# Patient Record
Sex: FEMALE | Race: WHITE | Employment: STUDENT | URBAN - NONMETROPOLITAN AREA
[De-identification: names, ages, dates, MRNs, and addresses within clinical notes are randomized per-mention and may not be internally consistent; named-entity substitution may affect disease eponyms.]

---
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
## 2018-09-14 ENCOUNTER — DOCTOR'S OFFICE (OUTPATIENT)
Dept: URBAN - NONMETROPOLITAN AREA CLINIC 1 | Facility: CLINIC | Age: 18
Setting detail: OPHTHALMOLOGY
End: 2018-09-14
Payer: COMMERCIAL

## 2018-09-14 DIAGNOSIS — H16.001: ICD-10-CM

## 2018-09-14 PROCEDURE — 92012 INTRM OPH EXAM EST PATIENT: CPT | Performed by: OPTOMETRIST

## 2018-09-14 ASSESSMENT — REFRACTION_CURRENTRX
OD_CYLINDER: SPH
OS_CYLINDER: SPH
OS_VPRISM_DIRECTION: SV
OD_OVR_VA: 20/
OD_OVR_VA: 20/
OS_OVR_VA: 20/
OD_VPRISM_DIRECTION: SV
OS_OVR_VA: 20/
OS_SPHERE: -3.25
OD_OVR_VA: 20/
OS_OVR_VA: 20/
OD_SPHERE: -3.25

## 2018-09-14 ASSESSMENT — REFRACTION_MANIFEST
OS_VA1: 20/20-2
OS_CYLINDER: -0.50
OD_CYLINDER: -0.50
OD_VA2: 20/20
OD_VA1: 20/
OS_VA3: 20/
OS_VA2: 20/20
OU_VA: 20/20
OS_VA3: 20/
OD_SPHERE: -3.75
OD_VA2: 20/
OD_AXIS: 160
OS_SPHERE: -3.75
OU_VA: 20/
OD_VA3: 20/
OS_AXIS: 180
OS_VA1: 20/
OD_VA3: 20/
OS_VA2: 20/
OD_VA1: 20/20-2

## 2018-09-14 ASSESSMENT — SPHEQUIV_DERIVED
OD_SPHEQUIV: -4
OS_SPHEQUIV: -4.25
OD_SPHEQUIV: -4.75
OS_SPHEQUIV: -4

## 2018-09-14 ASSESSMENT — LID EXAM ASSESSMENTS: OD_EDEMA: RUL T 1+

## 2018-09-14 ASSESSMENT — VISUAL ACUITY
OD_BCVA: 20/20
OS_BCVA: 20/25-2

## 2018-09-14 ASSESSMENT — REFRACTION_AUTOREFRACTION
OS_CYLINDER: 0.00
OD_SPHERE: -4.75
OD_CYLINDER: 0.00
OD_AXIS: 180
OS_AXIS: 180
OS_SPHERE: -4.25

## 2018-09-17 ENCOUNTER — DOCTOR'S OFFICE (OUTPATIENT)
Dept: URBAN - NONMETROPOLITAN AREA CLINIC 1 | Facility: CLINIC | Age: 18
Setting detail: OPHTHALMOLOGY
End: 2018-09-17
Payer: COMMERCIAL

## 2018-09-17 DIAGNOSIS — H16.001: ICD-10-CM

## 2018-09-17 PROCEDURE — 92012 INTRM OPH EXAM EST PATIENT: CPT | Performed by: OPTOMETRIST

## 2018-09-17 ASSESSMENT — REFRACTION_CURRENTRX
OD_OVR_VA: 20/
OS_OVR_VA: 20/

## 2018-09-17 ASSESSMENT — VISUAL ACUITY
OD_BCVA: 20/20
OS_BCVA: 20/25-2

## 2018-09-17 ASSESSMENT — REFRACTION_MANIFEST
OS_VA2: 20/
OS_VA1: 20/
OS_VA3: 20/
OS_VA3: 20/
OS_VA2: 20/
OU_VA: 20/
OD_VA3: 20/
OD_VA3: 20/
OD_VA1: 20/
OU_VA: 20/
OD_VA1: 20/
OD_VA2: 20/
OS_VA1: 20/
OD_VA2: 20/

## 2018-09-17 ASSESSMENT — CONFRONTATIONAL VISUAL FIELD TEST (CVF)
OS_FINDINGS: FULL
OD_FINDINGS: FULL

## 2018-09-24 ENCOUNTER — RX ONLY (RX ONLY)
Age: 18
End: 2018-09-24

## 2018-09-24 ENCOUNTER — DOCTOR'S OFFICE (OUTPATIENT)
Dept: URBAN - NONMETROPOLITAN AREA CLINIC 1 | Facility: CLINIC | Age: 18
Setting detail: OPHTHALMOLOGY
End: 2018-09-24
Payer: COMMERCIAL

## 2018-09-24 DIAGNOSIS — H16.001: ICD-10-CM

## 2018-09-24 PROCEDURE — 92012 INTRM OPH EXAM EST PATIENT: CPT | Performed by: OPTOMETRIST

## 2018-09-24 ASSESSMENT — REFRACTION_CURRENTRX
OD_OVR_VA: 20/
OS_OVR_VA: 20/

## 2018-09-24 ASSESSMENT — REFRACTION_MANIFEST
OS_VA3: 20/
OD_VA1: 20/
OD_VA2: 20/
OS_VA1: 20/
OS_VA3: 20/
OS_VA1: 20/
OD_VA2: 20/
OS_VA2: 20/
OS_VA2: 20/
OU_VA: 20/
OD_VA1: 20/
OU_VA: 20/
OD_VA3: 20/
OD_VA3: 20/

## 2018-09-24 ASSESSMENT — CORNEAL SURGICAL SCARRING: OD_SCARRING: PERIPHERAL STROMAL

## 2018-09-24 ASSESSMENT — CONFRONTATIONAL VISUAL FIELD TEST (CVF)
OS_FINDINGS: FULL
OD_FINDINGS: FULL

## 2018-09-24 ASSESSMENT — VISUAL ACUITY
OS_BCVA: 20/25
OD_BCVA: 20/20

## 2018-10-22 ENCOUNTER — DOCTOR'S OFFICE (OUTPATIENT)
Dept: URBAN - NONMETROPOLITAN AREA CLINIC 1 | Facility: CLINIC | Age: 18
Setting detail: OPHTHALMOLOGY
End: 2018-10-22
Payer: COMMERCIAL

## 2018-10-22 DIAGNOSIS — H17.821: ICD-10-CM

## 2018-10-22 DIAGNOSIS — H16.001: ICD-10-CM

## 2018-10-22 PROCEDURE — 92012 INTRM OPH EXAM EST PATIENT: CPT | Performed by: OPTOMETRIST

## 2018-10-22 ASSESSMENT — VISUAL ACUITY
OS_BCVA: 20/20
OD_BCVA: 20/20

## 2018-10-22 ASSESSMENT — REFRACTION_MANIFEST
OS_VA3: 20/
OD_VA1: 20/
OD_VA2: 20/
OS_VA3: 20/
OS_VA1: 20/
OS_VA2: 20/
OD_VA3: 20/
OD_VA1: 20/
OU_VA: 20/
OS_VA2: 20/
OU_VA: 20/
OS_VA1: 20/
OD_VA2: 20/
OD_VA3: 20/

## 2018-10-22 ASSESSMENT — REFRACTION_CURRENTRX
OS_OVR_VA: 20/
OD_OVR_VA: 20/
OS_OVR_VA: 20/
OD_OVR_VA: 20/
OD_OVR_VA: 20/
OS_OVR_VA: 20/

## 2018-10-22 ASSESSMENT — CONFRONTATIONAL VISUAL FIELD TEST (CVF)
OD_FINDINGS: FULL
OS_FINDINGS: FULL

## 2018-10-22 ASSESSMENT — CORNEAL SURGICAL SCARRING: OD_SCARRING: PERIPHERAL STROMAL

## 2018-12-20 ENCOUNTER — DOCTOR'S OFFICE (OUTPATIENT)
Dept: URBAN - NONMETROPOLITAN AREA CLINIC 1 | Facility: CLINIC | Age: 18
Setting detail: OPHTHALMOLOGY
End: 2018-12-20
Payer: COMMERCIAL

## 2018-12-20 DIAGNOSIS — H52.13: ICD-10-CM

## 2018-12-20 DIAGNOSIS — H52.223: ICD-10-CM

## 2018-12-20 PROBLEM — H16.001 CORNEAL ULCER; RIGHT EYE: Status: RESOLVED | Noted: 2018-09-14 | Resolved: 2018-12-20

## 2018-12-20 PROCEDURE — 92014 COMPRE OPH EXAM EST PT 1/>: CPT | Performed by: OPTOMETRIST

## 2018-12-20 PROCEDURE — 92310 CONTACT LENS FITTING OU: CPT | Performed by: OPTOMETRIST

## 2018-12-20 ASSESSMENT — REFRACTION_MANIFEST
OS_VA3: 20/
OD_VA3: 20/
OD_CYLINDER: -0.50
OS_VA1: 20/20
OU_VA: 20/20
OD_VA3: 20/
OD_VA2: 20/
OD_AXIS: 150
OS_VA2: 20/
OD_VA1: 20/
OD_SPHERE: -4.00
OS_CYLINDER: -0.50
OS_SPHERE: -4.00
OD_VA2: 20/
OU_VA: 20/
OS_AXIS: 015
OS_VA2: 20/
OD_VA1: 20/20
OS_VA1: 20/
OS_VA3: 20/

## 2018-12-20 ASSESSMENT — VISUAL ACUITY
OD_BCVA: 20/30
OS_BCVA: 20/30

## 2018-12-20 ASSESSMENT — REFRACTION_CURRENTRX
OS_OVR_VA: 20/
OD_OVR_VA: 20/
OD_OVR_VA: 20/
OS_OVR_VA: 20/
OS_OVR_VA: 20/
OD_OVR_VA: 20/

## 2018-12-20 ASSESSMENT — CORNEAL SURGICAL SCARRING: OD_SCARRING: PERIPHERAL STROMAL

## 2018-12-20 ASSESSMENT — SPHEQUIV_DERIVED
OD_SPHEQUIV: -4.25
OS_SPHEQUIV: -4.25

## 2018-12-20 ASSESSMENT — CONFRONTATIONAL VISUAL FIELD TEST (CVF)
OS_FINDINGS: FULL
OD_FINDINGS: FULL

## 2019-12-31 ENCOUNTER — DOCTOR'S OFFICE (OUTPATIENT)
Dept: URBAN - NONMETROPOLITAN AREA CLINIC 1 | Facility: CLINIC | Age: 19
Setting detail: OPHTHALMOLOGY
End: 2019-12-31
Payer: COMMERCIAL

## 2019-12-31 DIAGNOSIS — H52.223: ICD-10-CM

## 2019-12-31 DIAGNOSIS — H52.13: ICD-10-CM

## 2019-12-31 PROCEDURE — 92310 CONTACT LENS FITTING OU: CPT | Performed by: OPTOMETRIST

## 2019-12-31 PROCEDURE — 92014 COMPRE OPH EXAM EST PT 1/>: CPT | Performed by: OPTOMETRIST

## 2019-12-31 ASSESSMENT — REFRACTION_MANIFEST
OD_CYLINDER: -0.25
OD_VA1: 20/20
OS_AXIS: 060
OS_VA1: 20/20
OS_SPHERE: -4.00
OD_SPHERE: -4.00
OS_CYLINDER: -0.25
OS_VA3: 20/
OU_VA: 20/20
OD_VA2: 20/20
OD_VA3: 20/
OS_VA2: 20/20
OD_AXIS: 130

## 2019-12-31 ASSESSMENT — REFRACTION_AUTOREFRACTION
OS_CYLINDER: -0.50
OD_AXIS: 132
OS_AXIS: 057
OS_SPHERE: -3.25
OD_SPHERE: -3.50
OD_CYLINDER: -1.00

## 2019-12-31 ASSESSMENT — CONFRONTATIONAL VISUAL FIELD TEST (CVF)
OS_FINDINGS: FULL
OD_FINDINGS: FULL

## 2019-12-31 ASSESSMENT — CORNEAL SURGICAL SCARRING: OD_SCARRING: PERIPHERAL STROMAL

## 2019-12-31 ASSESSMENT — SPHEQUIV_DERIVED
OD_SPHEQUIV: -4.125
OD_SPHEQUIV: -4
OS_SPHEQUIV: -4.125
OS_SPHEQUIV: -3.5

## 2019-12-31 ASSESSMENT — VISUAL ACUITY
OD_BCVA: 20/25
OS_BCVA: 20/25

## 2020-07-09 ENCOUNTER — OPTICAL OFFICE (OUTPATIENT)
Dept: URBAN - NONMETROPOLITAN AREA CLINIC 4 | Facility: CLINIC | Age: 20
Setting detail: OPHTHALMOLOGY
End: 2020-07-09
Payer: COMMERCIAL

## 2020-07-09 DIAGNOSIS — H52.223: ICD-10-CM

## 2020-07-09 PROCEDURE — V2750 ANTI-REFLECTIVE COATING: HCPCS | Performed by: OPTOMETRIST

## 2020-07-09 PROCEDURE — V2020 VISION SVCS FRAMES PURCHASES: HCPCS | Performed by: OPTOMETRIST

## 2020-07-09 PROCEDURE — V2025 EYEGLASSES DELUX FRAMES: HCPCS | Performed by: OPTOMETRIST

## 2020-07-09 PROCEDURE — V2103 SPHEROCYLINDR 4.00D/12-2.00D: HCPCS | Performed by: OPTOMETRIST

## 2020-12-11 ENCOUNTER — TRANSCRIBE ORDERS (OUTPATIENT)
Dept: URGENT CARE | Facility: CLINIC | Age: 20
End: 2020-12-11

## 2020-12-11 ENCOUNTER — APPOINTMENT (OUTPATIENT)
Dept: URGENT CARE | Facility: CLINIC | Age: 20
End: 2020-12-11

## 2020-12-11 DIAGNOSIS — Z02.1 PHYSICAL EXAM, PRE-EMPLOYMENT: Primary | ICD-10-CM

## 2020-12-11 DIAGNOSIS — Z02.1 PHYSICAL EXAM, PRE-EMPLOYMENT: ICD-10-CM

## 2020-12-11 PROCEDURE — 86765 RUBEOLA ANTIBODY: CPT | Performed by: PHYSICIAN ASSISTANT

## 2020-12-11 PROCEDURE — 86762 RUBELLA ANTIBODY: CPT | Performed by: PHYSICIAN ASSISTANT

## 2020-12-11 PROCEDURE — 86787 VARICELLA-ZOSTER ANTIBODY: CPT | Performed by: PHYSICIAN ASSISTANT

## 2020-12-11 PROCEDURE — 86480 TB TEST CELL IMMUN MEASURE: CPT | Performed by: PHYSICIAN ASSISTANT

## 2020-12-11 PROCEDURE — 86735 MUMPS ANTIBODY: CPT | Performed by: PHYSICIAN ASSISTANT

## 2020-12-12 LAB — RUBV IGG SERPL IA-ACNC: >175 IU/ML

## 2020-12-14 LAB — VZV IGG SER IA-ACNC: NORMAL

## 2020-12-15 LAB
GAMMA INTERFERON BACKGROUND BLD IA-ACNC: 0.02 IU/ML
M TB IFN-G BLD-IMP: NEGATIVE
M TB IFN-G CD4+ BCKGRND COR BLD-ACNC: 0 IU/ML
M TB IFN-G CD4+ BCKGRND COR BLD-ACNC: 0 IU/ML
MEV IGG SER QL: NORMAL
MITOGEN IGNF BCKGRD COR BLD-ACNC: >10 IU/ML
MUV IGG SER QL: NORMAL

## 2021-03-10 ENCOUNTER — RX ONLY (RX ONLY)
Age: 21
End: 2021-03-10

## 2021-03-10 ENCOUNTER — DOCTOR'S OFFICE (OUTPATIENT)
Dept: URBAN - NONMETROPOLITAN AREA CLINIC 1 | Facility: CLINIC | Age: 21
Setting detail: OPHTHALMOLOGY
End: 2021-03-10
Payer: COMMERCIAL

## 2021-03-10 VITALS — HEIGHT: 55 IN

## 2021-03-10 DIAGNOSIS — H52.13: ICD-10-CM

## 2021-03-10 DIAGNOSIS — H52.223: ICD-10-CM

## 2021-03-10 PROBLEM — H17.821 CORNEAL SCAR PERIPHERAL; RIGHT EYE: Status: ACTIVE | Noted: 2018-10-22

## 2021-03-10 PROCEDURE — 92014 COMPRE OPH EXAM EST PT 1/>: CPT | Performed by: OPTOMETRIST

## 2021-03-10 PROCEDURE — 92310 CONTACT LENS FITTING OU: CPT | Performed by: OPTOMETRIST

## 2021-03-10 ASSESSMENT — REFRACTION_MANIFEST
OD_VA1: 20/20
OS_CYLINDER: -0.25
OS_VA1: 20/20
OU_VA: 20/20
OD_SPHERE: -4.00
OD_CYLINDER: -0.25
OS_SPHERE: -4.00
OS_AXIS: 060
OD_AXIS: 130
OS_VA2: 20/20
OD_VA2: 20/20

## 2021-03-10 ASSESSMENT — REFRACTION_AUTOREFRACTION
OS_CYLINDER: -0.25
OD_AXIS: 128
OS_AXIS: 48
OS_SPHERE: -4.00
OD_CYLINDER: -0.25
OD_SPHERE: -4.00

## 2021-03-10 ASSESSMENT — VISUAL ACUITY
OD_BCVA: 20/20
OS_BCVA: 20/20

## 2021-03-10 ASSESSMENT — SPHEQUIV_DERIVED
OS_SPHEQUIV: -4.125
OS_SPHEQUIV: -4.125
OD_SPHEQUIV: -4.125
OD_SPHEQUIV: -4.125

## 2021-03-10 ASSESSMENT — TONOMETRY
OD_IOP_MMHG: 15
OS_IOP_MMHG: 15

## 2021-03-10 ASSESSMENT — CORNEAL SURGICAL SCARRING: OD_SCARRING: PERIPHERAL STROMAL

## 2021-03-10 ASSESSMENT — CONFRONTATIONAL VISUAL FIELD TEST (CVF)
OD_FINDINGS: FULL
OS_FINDINGS: FULL

## 2021-09-16 ENCOUNTER — APPOINTMENT (OUTPATIENT)
Dept: LAB | Facility: CLINIC | Age: 21
End: 2021-09-16

## 2021-09-16 DIAGNOSIS — Z02.1 PRE-EMPLOYMENT EXAMINATION: ICD-10-CM

## 2021-09-16 PROCEDURE — 86480 TB TEST CELL IMMUN MEASURE: CPT

## 2021-09-16 PROCEDURE — 36415 COLL VENOUS BLD VENIPUNCTURE: CPT

## 2021-09-19 LAB
GAMMA INTERFERON BACKGROUND BLD IA-ACNC: 0.02 IU/ML
M TB IFN-G BLD-IMP: NEGATIVE
M TB IFN-G CD4+ BCKGRND COR BLD-ACNC: 0 IU/ML
M TB IFN-G CD4+ BCKGRND COR BLD-ACNC: 0 IU/ML
MITOGEN IGNF BCKGRD COR BLD-ACNC: >10 IU/ML

## 2022-07-13 ENCOUNTER — HOSPITAL ENCOUNTER (EMERGENCY)
Facility: HOSPITAL | Age: 22
Discharge: HOME | End: 2022-07-13
Attending: EMERGENCY MEDICINE
Payer: OTHER MISCELLANEOUS

## 2022-07-13 VITALS
WEIGHT: 165 LBS | TEMPERATURE: 98.2 F | HEART RATE: 72 BPM | SYSTOLIC BLOOD PRESSURE: 144 MMHG | DIASTOLIC BLOOD PRESSURE: 82 MMHG | HEIGHT: 66 IN | RESPIRATION RATE: 16 BRPM | BODY MASS INDEX: 26.52 KG/M2 | OXYGEN SATURATION: 100 %
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
## 2022-07-13 LAB
ALT SERPL W P-5'-P-CCNC: 15 U/L (ref 12–78)
HBV SURFACE AB SER-ACNC: <3.1 MIU/ML
HBV SURFACE AG SER QL: NORMAL
HCV AB SER QL: NORMAL

## 2022-07-13 PROCEDURE — 86803 HEPATITIS C AB TEST: CPT | Performed by: EMERGENCY MEDICINE

## 2022-07-13 PROCEDURE — 86706 HEP B SURFACE ANTIBODY: CPT | Performed by: EMERGENCY MEDICINE

## 2022-07-13 PROCEDURE — 99282 EMERGENCY DEPT VISIT SF MDM: CPT | Performed by: EMERGENCY MEDICINE

## 2022-07-13 PROCEDURE — 84460 ALANINE AMINO (ALT) (SGPT): CPT | Performed by: EMERGENCY MEDICINE

## 2022-07-13 PROCEDURE — 87340 HEPATITIS B SURFACE AG IA: CPT | Performed by: EMERGENCY MEDICINE

## 2022-07-13 PROCEDURE — 36415 COLL VENOUS BLD VENIPUNCTURE: CPT | Performed by: EMERGENCY MEDICINE

## 2022-07-13 PROCEDURE — 99283 EMERGENCY DEPT VISIT LOW MDM: CPT

## 2022-07-13 PROCEDURE — 87389 HIV-1 AG W/HIV-1&-2 AB AG IA: CPT | Performed by: EMERGENCY MEDICINE

## 2022-07-13 NOTE — ED PROVIDER NOTES
History  Chief Complaint   Patient presents with    Infectious Exposure - Needlestick     Needle stick while putting in IV     42-year-old female without pertinent history who is up-to-date on immunizations including hepatitis who presents for needlestick injury that occurred while at work today  States that she accidentally picked up an IV needle that she had already placed in a patient off the floor and when she did  off the floor she accidentally poked her left thumb  Denies any concerns for any infections from the source patient  States that she does not want any prophylactic treatments and will wait on lab results  Denies any significant injury from the needle stick  States that she did wash out the injury with alcohol and then with soap and water  No other concerns  None       History reviewed  No pertinent past medical history  History reviewed  No pertinent surgical history  History reviewed  No pertinent family history  I have reviewed and agree with the history as documented  E-Cigarette/Vaping    E-Cigarette Use Never User      E-Cigarette/Vaping Substances     Social History     Tobacco Use    Smoking status: Never Smoker    Smokeless tobacco: Never Used   Vaping Use    Vaping Use: Never used   Substance Use Topics    Drug use: Never       Review of Systems   Constitutional: Negative for activity change, appetite change, chills and fever  HENT: Negative for congestion, rhinorrhea and sore throat  Eyes: Negative for visual disturbance  Respiratory: Negative for chest tightness, shortness of breath and wheezing  Cardiovascular: Negative for chest pain, palpitations and leg swelling  Gastrointestinal: Negative for abdominal pain, constipation, diarrhea, nausea and vomiting  Genitourinary: Negative for dysuria, flank pain and pelvic pain  Musculoskeletal: Negative for back pain and neck pain  Skin: Positive for wound  Negative for rash     Neurological: Negative for weakness, numbness and headaches  Psychiatric/Behavioral: Negative for behavioral problems  Physical Exam  Physical Exam  Vitals and nursing note reviewed  Constitutional:       General: She is not in acute distress  Appearance: She is well-developed  She is not diaphoretic  HENT:      Head: Normocephalic and atraumatic  Right Ear: External ear normal       Left Ear: External ear normal       Nose: Nose normal    Eyes:      Pupils: Pupils are equal, round, and reactive to light  Cardiovascular:      Rate and Rhythm: Normal rate  Pulses: Normal pulses  Pulmonary:      Effort: Pulmonary effort is normal    Musculoskeletal:         General: No tenderness or deformity  Normal range of motion  Cervical back: Normal range of motion and neck supple  Skin:     General: Skin is warm and dry  Capillary Refill: Capillary refill takes less than 2 seconds  Comments: Left thumb with reported pinpoint injuries; not visible on site   Neurological:      Mental Status: She is alert and oriented to person, place, and time  Motor: No abnormal muscle tone           Vital Signs  ED Triage Vitals [07/13/22 0610]   Temperature Pulse Respirations Blood Pressure SpO2   98 2 °F (36 8 °C) 72 16 144/82 100 %      Temp Source Heart Rate Source Patient Position - Orthostatic VS BP Location FiO2 (%)   Temporal Monitor Sitting Left arm --      Pain Score       No Pain           Vitals:    07/13/22 0610   BP: 144/82   Pulse: 72   Patient Position - Orthostatic VS: Sitting         Visual Acuity      ED Medications  Medications - No data to display    Diagnostic Studies  Results Reviewed     Procedure Component Value Units Date/Time    ALT [[id number]]  (Normal) Collected: 07/13/22 0620    Lab Status: Final result Specimen: Blood from Arm, Right Updated: 07/13/22 0642     ALT 15 U/L     HIV 1/2 Antigen/Antibody (4th Generation) w Reflex [facility] [[id number]] Collected: 07/13/22 0620    Lab Status: In process Specimen: Blood from Arm, Right Updated: 07/13/22 0624    Hepatitis B surface antigen [[id number]] Collected: 07/13/22 0620    Lab Status: In process Specimen: Blood from Arm, Right Updated: 07/13/22 0624    Hepatitis C antibody [[id number]] Collected: 07/13/22 7496    Lab Status: In process Specimen: Blood from Arm, Right Updated: 07/13/22 0624    Hepatitis B surface antibody [[id number]] Collected: 07/13/22 0813    Lab Status: In process Specimen: Blood from Arm, Right Updated: 07/13/22 0624                 No orders to display              Procedures  Procedures         ED Course               SBIRT 20yo+    Flowsheet Row Most Recent Value   SBIRT (25 yo +)    In order to provide better care to our patients, we are screening all of our patients for alcohol and drug use  Would it be okay to ask you these screening questions? Yes Filed at: 07/13/2022 0609   Initial Alcohol Screen: US AUDIT-C     1  How often do you have a drink containing alcohol? 0 Filed at: 07/13/2022 0609   2  How many drinks containing alcohol do you have on a typical day you are drinking? 0 Filed at: 07/13/2022 0609   3a  Male UNDER 65: How often do you have five or more drinks on one occasion? 0 Filed at: 07/13/2022 0609   3b  FEMALE Any Age, or MALE 65+: How often do you have 4 or more drinks on one occassion? 0 Filed at: 07/13/2022 2129   Audit-C Score 0 Filed at: 07/13/2022 2146   NORRIS: How many times in the past year have you    Used an illegal drug or used a prescription medication for non-medical reasons? Never Filed at: 07/13/2022 3104               RESULTS:  Results Reviewed     Procedure Component Value Units Date/Time    ALT [[id number]]  (Normal) Collected: 07/13/22 0620    Lab Status: Final result Specimen: Blood from Arm, Right Updated: 07/13/22 0642     ALT 15 U/L     HIV 1/2 Antigen/Antibody (4th Generation) w Reflex [facility] [[id number]] Collected: 07/13/22 0620    Lab Status:  In process Specimen: Blood from Arm, Right Updated: 07/13/22 0624    Hepatitis B surface antigen [[id number]] Collected: 07/13/22 0620    Lab Status: In process Specimen: Blood from Arm, Right Updated: 07/13/22 0624    Hepatitis C antibody [[id number]] Collected: 07/13/22 3029    Lab Status: In process Specimen: Blood from Arm, Right Updated: 07/13/22 0624    Hepatitis B surface antibody [[id number]] Collected: 07/13/22 3967    Lab Status: In process Specimen: Blood from Arm, Right Updated: 07/13/22 0624          No orders to display       Vitals:    07/13/22 0610   BP: 144/82   TempSrc: Temporal   Pulse: 72   Resp: 16   Patient Position - Orthostatic VS: Sitting   Temp: 98 2 °F (36 8 °C)             MDM  Number of Diagnoses or Management Options  Diagnosis management comments: 47-year-old female without pertinent history who presents for needlestick injury  Patient does not have any concerns from the source patient for an infection  Does not wash to have prophylactic treatment  Lab order placed for testing to confirm no infection from needlestick injury  Patient's thumb does not have any signs of bleeding  Already cleaned out and irrigated prior to arrival   Advised returning for worsening symptoms  Understands outpatient management plan  Form filled out  Amount and/or Complexity of Data Reviewed  Review and summarize past medical records: yes        Disposition  Final diagnoses:   Needlestick injury accident with exposure to body fluid     Time reflects when diagnosis was documented in both MDM as applicable and the Disposition within this note     Time User Action Codes Description Comment    7/13/2022  6:35 AM Марина Lima Add [U70  1XXA] Needlestick injury accident with exposure to body fluid       ED Disposition     ED Disposition   Discharge    Condition   Stable    Date/Time   Wed Jul 13, 2022  6:35 AM    Comment   Kimberly Malcolm discharge to home/self care                 Follow-up Information     Follow up With Specialties Details Why Contact Info    Your PCP              There are no discharge medications for this patient  No discharge procedures on file      PDMP Review     None          ED Provider  Electronically Signed by           Fredy Kennedy MD  07/13/22 5697

## 2022-07-13 NOTE — DISCHARGE INSTRUCTIONS
Thank you for letting us take care of you  You have been evaluated for a needlestick injury  Please follow-up as instructed  Please return for worsening symptoms  At this time, you have no clinical evidence of symptoms or problems that will require hospitalization, however you should be evaluated soon by a primary care physician, and contact information has been provided  Follow up with your primary care physician  This is important as many medical conditions can be managed as an outpatient, in addition to routine health screening  Seeing your primary doctor often can help identify changes in the medical issue that brought you to the ED for care today  If you experience any new symptoms or acute worsening of current symptoms, please return to the ED

## 2022-07-14 LAB — HIV 1+2 AB+HIV1 P24 AG SERPL QL IA: NORMAL

## 2022-11-15 DIAGNOSIS — Z01.84 IMMUNITY TO HEPATITIS B VIRUS DEMONSTRATED BY SEROLOGIC TEST: Primary | ICD-10-CM

## 2023-01-13 ENCOUNTER — APPOINTMENT (OUTPATIENT)
Dept: LAB | Facility: HOSPITAL | Age: 23
End: 2023-01-13

## 2023-01-13 DIAGNOSIS — Z01.84 IMMUNITY TO HEPATITIS B VIRUS DEMONSTRATED BY SEROLOGIC TEST: ICD-10-CM

## 2023-01-14 LAB — HBV SURFACE AB SER-ACNC: 580.61 MIU/ML

## 2023-12-05 ENCOUNTER — DOCTOR'S OFFICE (OUTPATIENT)
Dept: URBAN - NONMETROPOLITAN AREA CLINIC 1 | Facility: CLINIC | Age: 23
Setting detail: OPHTHALMOLOGY
End: 2023-12-05
Payer: COMMERCIAL

## 2023-12-05 DIAGNOSIS — H52.223: ICD-10-CM

## 2023-12-05 DIAGNOSIS — H52.13: ICD-10-CM

## 2023-12-05 PROCEDURE — 92014 COMPRE OPH EXAM EST PT 1/>: CPT | Performed by: OPTOMETRIST

## 2023-12-05 PROCEDURE — 92310 CONTACT LENS FITTING OU: CPT | Performed by: OPTOMETRIST

## 2023-12-05 ASSESSMENT — CORNEAL SURGICAL SCARRING: OD_SCARRING: PERIPHERAL STROMAL

## 2023-12-05 ASSESSMENT — REFRACTION_AUTOREFRACTION
OS_AXIS: 021
OD_SPHERE: -3.75
OS_SPHERE: -3.75
OD_CYLINDER: -0.50
OS_CYLINDER: -0.75
OD_AXIS: 120

## 2023-12-05 ASSESSMENT — SPHEQUIV_DERIVED
OD_SPHEQUIV: -4
OD_SPHEQUIV: -4.25
OS_SPHEQUIV: -4.125
OS_SPHEQUIV: -4.125

## 2023-12-05 ASSESSMENT — REFRACTION_MANIFEST
OS_SPHERE: -4.00
OS_VA1: 20/20
OD_CYLINDER: -0.50
OS_AXIS: 060
OD_VA2: 20/20
OS_CYLINDER: -0.25
OD_AXIS: 120
OD_SPHERE: -4.00
OD_VA1: 20/20
OS_VA2: 20/20
OU_VA: 20/20

## 2023-12-05 ASSESSMENT — CONFRONTATIONAL VISUAL FIELD TEST (CVF)
OS_FINDINGS: FULL
OD_FINDINGS: FULL

## 2024-01-31 ENCOUNTER — HOSPITAL ENCOUNTER (EMERGENCY)
Facility: HOSPITAL | Age: 24
Discharge: HOME | End: 2024-01-31
Attending: EMERGENCY MEDICINE
Payer: OTHER MISCELLANEOUS

## 2024-01-31 VITALS
SYSTOLIC BLOOD PRESSURE: 130 MMHG | RESPIRATION RATE: 20 BRPM | TEMPERATURE: 97.3 F | OXYGEN SATURATION: 99 % | BODY MASS INDEX: 25.82 KG/M2 | DIASTOLIC BLOOD PRESSURE: 74 MMHG | WEIGHT: 160 LBS | HEART RATE: 68 BPM
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
## 2024-01-31 LAB
ALT SERPL W P-5'-P-CCNC: 25 U/L (ref 7–52)
HBV SURFACE AB SER-ACNC: 178 MIU/ML
HBV SURFACE AG SER QL: NORMAL
HCV AB SER QL: NORMAL
HIV 1+2 AB+HIV1 P24 AG SERPL QL IA: NORMAL
HIV 2 AB SERPL QL IA: NORMAL
HIV1 AB SERPL QL IA: NORMAL
HIV1 P24 AG SERPL QL IA: NORMAL

## 2024-01-31 PROCEDURE — 86803 HEPATITIS C AB TEST: CPT | Performed by: EMERGENCY MEDICINE

## 2024-01-31 PROCEDURE — 86706 HEP B SURFACE ANTIBODY: CPT | Performed by: EMERGENCY MEDICINE

## 2024-01-31 PROCEDURE — 84460 ALANINE AMINO (ALT) (SGPT): CPT | Performed by: EMERGENCY MEDICINE

## 2024-01-31 PROCEDURE — 87340 HEPATITIS B SURFACE AG IA: CPT | Performed by: EMERGENCY MEDICINE

## 2024-01-31 PROCEDURE — 36415 COLL VENOUS BLD VENIPUNCTURE: CPT | Performed by: EMERGENCY MEDICINE

## 2024-01-31 PROCEDURE — 99282 EMERGENCY DEPT VISIT SF MDM: CPT

## 2024-01-31 PROCEDURE — 99284 EMERGENCY DEPT VISIT MOD MDM: CPT | Performed by: EMERGENCY MEDICINE

## 2024-01-31 PROCEDURE — 87389 HIV-1 AG W/HIV-1&-2 AB AG IA: CPT | Performed by: EMERGENCY MEDICINE

## 2024-01-31 NOTE — ED NOTES
Patient had an accidental needle stick to her left index finger. Reports the patient threw their arm up and the needle dislodged sticking her finger. She reports the stick did bleed and she washed her hands with soap and water post exposure.      Obed Falk RN  01/31/24 0817

## 2024-01-31 NOTE — ED PROVIDER NOTES
"Emergency Department Employee Health Note  Margot Torres 23 y.o. female MRN: [id number]  Unit/Bed#: ED 07/ED 07 Encounter: [id number]        History of Present Illness     Chief Complaint:   Chief Complaint   Patient presents with    Body Fluid Exposure     Pt had needlestick injury with contaminated needle to left hand      HPI:  Margot Torres is a 23 y.o. female who presents with needlestick to left middle index finger..  Mechanism:           Patient is a 23-year-old reporting to the emergency room with report of a needlestick to her left index finger.  Patient is up-to-date on her tetanus.  Hepatitis B documented as August 2022.  Patient was attempting to inject a patient with subcutaneous heparin.  She had advised patient of same.  Patient subsequently \"jumped\" and caused the patient to sustain a needlestick in her left middle finger.  No other injury.        Review of Systems    Historical Information     Immunizations:   Immunization History   Administered Date(s) Administered    COVID-19 PFIZER VACCINE 0.3 ML IM 03/30/2021, 04/21/2021    DTaP 01/13/1999, 03/16/1999, 05/11/1999, 02/03/2003    HPV Quadrivalent 08/06/2012, 10/08/2012, 03/11/2013    Hep A, ped/adol, 2 dose 02/20/2007, 08/28/2007    Hep B / HiB 01/13/1999, 03/16/1999, 05/11/1999    Hep B, Adolescent or Pediatric 1998, 01/13/1999, 05/11/1999    Hepatitis B 08/30/2022, 11/15/2022    Hib (PRP-T) 01/13/1999, 03/16/1999, 05/11/1999, 05/08/2000    INFLUENZA 10/31/2018, 10/16/2019    IPV 01/13/1999, 03/16/1999, 05/08/2000, 02/13/2003    Influenza, seasonal, injectable 11/13/2007, 10/29/2008    MMR 02/06/2000, 02/03/2003    Meningococcal B, Recombinant (TRUMENBA) 08/15/2016, 02/20/2017    Meningococcal Conjugate (MCV4O) 08/15/2016    Meningococcal MCV4P 08/15/2016    Pneumococcal Conjugate PCV 7 01/15/2001, 12/22/2004    Rotavirus Pentavalent 01/13/1999, 03/16/1999, 05/11/1999    Tdap 07/28/2010, 07/29/2020    Tuberculin Skin Test-PPD " Intradermal 07/03/2019, 07/12/2019    Varicella 09/05/1999, 02/20/2007       History reviewed. No pertinent past medical history.  History reviewed. No pertinent family history.  History reviewed. No pertinent surgical history.  Social History     Tobacco Use    Smoking status: Never    Smokeless tobacco: Never   Vaping Use    Vaping status: Never Used   Substance Use Topics    Drug use: Never     E-Cigarette/Vaping    E-Cigarette Use Never User      E-Cigarette/Vaping Substances         Meds/Allergies   None       No Known Allergies    PHYSICAL EXAM  Physical Exam  Vitals and nursing note reviewed.   Constitutional:       General: She is not in acute distress.     Appearance: She is normal weight. She is not ill-appearing or toxic-appearing.   HENT:      Head: Normocephalic and atraumatic.      Right Ear: External ear normal.      Left Ear: External ear normal.      Nose: Nose normal.      Mouth/Throat:      Mouth: Mucous membranes are moist.   Pulmonary:      Effort: Pulmonary effort is normal. No respiratory distress.   Abdominal:      General: Abdomen is flat. There is no distension.   Musculoskeletal:         General: Signs of injury present.      Comments: Puncture wound left index finger overlying the middle phalanx.   Skin:     Coloration: Skin is not pale.   Neurological:      General: No focal deficit present.      Mental Status: She is alert.   Psychiatric:         Mood and Affect: Mood normal.         Thought Content: Thought content normal.         Judgment: Judgment normal.         Vital Signs  ED Triage Vitals [01/31/24 0749]   Temperature Pulse Respirations Blood Pressure SpO2   (!) 97.3 °F (36.3 °C) 68 20 130/74 99 %      Temp Source Heart Rate Source Patient Position - Orthostatic VS BP Location FiO2 (%)   Temporal Monitor Sitting Left arm --      Pain Score       --           Vitals:    01/31/24 0749 01/31/24 0924   BP: 130/74 130/74   Pulse: 68    Patient Position - Orthostatic VS: Sitting  "Sitting         Certification of Exposure:    (link to Employee Health Services: [facility] ([email or web address]): find link to BBP Exposure Instructions under important links on center of the page)    The patient is stable and has a history and physical exam consistent with an Sharp Injury and based on my assessment this exposure is Significant     If “NonSignificant” nothing further needs to be filled out.  If \"Significant\" please continue with the following questions    For Significant Exposures All of the following items must be completed:     Source Patient Name: Jasen Marie    Source Patient MRN: [id number]  Was the Source Patient's Provider contacted Yes, Providers name: Shinto   Was \"Exposure Panel - Source\" ordered (including Rapid HIV, HBsAg and anti-HCV) for Source Patient: Yes    Exposure Information    Type of Body Fluid Exposure: blood    Estimated volume of fluid: small up to 5cc     Exposure area:  intact skin    Skin Integrity: punctured    ED provider should review source patient chart for known history of HIV, Hepatitis B and Hepatitis C infection    Source patient HIV positive: No  Was the On-call Infectious Disease Provider contacted: No  Discussed treatment options with/for employee: No    Immunization History   Administered Date(s) Administered    COVID-19 PFIZER VACCINE 0.3 ML IM 03/30/2021, 04/21/2021    DTaP 01/13/1999, 03/16/1999, 05/11/1999, 02/03/2003    HPV Quadrivalent 08/06/2012, 10/08/2012, 03/11/2013    Hep A, ped/adol, 2 dose 02/20/2007, 08/28/2007    Hep B / HiB 01/13/1999, 03/16/1999, 05/11/1999    Hep B, Adolescent or Pediatric 1998, 01/13/1999, 05/11/1999    Hepatitis B 08/30/2022, 11/15/2022    Hib (PRP-T) 01/13/1999, 03/16/1999, 05/11/1999, 05/08/2000    INFLUENZA 10/31/2018, 10/16/2019    IPV 01/13/1999, 03/16/1999, 05/08/2000, 02/13/2003    Influenza, seasonal, injectable 11/13/2007, 10/29/2008    MMR 02/06/2000, 02/03/2003    Meningococcal B, " "Recombinant (TRUMENBA) 08/15/2016, 02/20/2017    Meningococcal Conjugate (MCV4O) 08/15/2016    Meningococcal MCV4P 08/15/2016    Pneumococcal Conjugate PCV 7 01/15/2001, 12/22/2004    Rotavirus Pentavalent 01/13/1999, 03/16/1999, 05/11/1999    Tdap 07/28/2010, 07/29/2020    Tuberculin Skin Test-PPD Intradermal 07/03/2019, 07/12/2019    Varicella 09/05/1999, 02/20/2007       Hepatitis B Vaccine History:    Immunization History   Administered Date(s) Administered    COVID-19 PFIZER VACCINE 0.3 ML IM 03/30/2021, 04/21/2021    DTaP 01/13/1999, 03/16/1999, 05/11/1999, 02/03/2003    HPV Quadrivalent 08/06/2012, 10/08/2012, 03/11/2013    Hep A, ped/adol, 2 dose 02/20/2007, 08/28/2007    Hep B / HiB 01/13/1999, 03/16/1999, 05/11/1999    Hep B, Adolescent or Pediatric 1998, 01/13/1999, 05/11/1999    Hepatitis B 08/30/2022, 11/15/2022    Hib (PRP-T) 01/13/1999, 03/16/1999, 05/11/1999, 05/08/2000    INFLUENZA 10/31/2018, 10/16/2019    IPV 01/13/1999, 03/16/1999, 05/08/2000, 02/13/2003    Influenza, seasonal, injectable 11/13/2007, 10/29/2008    MMR 02/06/2000, 02/03/2003    Meningococcal B, Recombinant (TRUMENBA) 08/15/2016, 02/20/2017    Meningococcal Conjugate (MCV4O) 08/15/2016    Meningococcal MCV4P 08/15/2016    Pneumococcal Conjugate PCV 7 01/15/2001, 12/22/2004    Rotavirus Pentavalent 01/13/1999, 03/16/1999, 05/11/1999    Tdap 07/28/2010, 07/29/2020    Tuberculin Skin Test-PPD Intradermal 07/03/2019, 07/12/2019    Varicella 09/05/1999, 02/20/2007       The patient has Previously been vaccinated for Hepatitis B.     HEPATITIS B IMMUNE GLOBULIN:  Not Indicated    Tetanus Vaccine History:    TDAP vaccination date: 2020    The patient has Tdap reported as up to date    Employee/Patient post exposure testing Has been performed.     \"Exposure Panel - Employee Baseline\"  (includes HBsAg, HBsAb, anti-HCV, ALT, HIV) with verbal consent and pretesting counseling for the patient Has been ordered.    Post-exposure " Prophylaxis treatment options were discussed today: Not Indicated      Visual Acuity      ED Medications  Medications - No data to display    Diagnostic Studies  Results Reviewed       Procedure Component Value Units Date/Time    Hepatitis C antibody [[id number]]  (Normal) Collected: 01/31/24 0902    Lab Status: Final result Specimen: Blood from Arm, Left Updated: 01/31/24 1413     Hepatitis C Ab Non-reactive    Hepatitis B surface antigen [[id number]]  (Normal) Collected: 01/31/24 0902    Lab Status: Final result Specimen: Blood from Arm, Left Updated: 01/31/24 1413     Hepatitis B Surface Ag Non-reactive    Hepatitis B surface antibody [[id number]] Collected: 01/31/24 0902    Lab Status: Final result Specimen: Blood from Arm, Left Updated: 01/31/24 1413     Hep B S Ab 178.00 mIU/mL     HIV 1/2 AG/AB w Reflex [facility] for 2 yr old and above [[id number]]  (Normal) Collected: 01/31/24 0902    Lab Status: Final result Specimen: Blood from Arm, Left Updated: 01/31/24 1409     HIV-1 p24 Antigen Non-Reactive     HIV-1 Antibody Non-Reactive     HIV-2 Antibody Non-Reactive     HIV Ag-Ab 5th Gen Non-Reactive    Narrative:      This 5th generation HIV Ag-Ab assay is a multiplex flow immunoassay intended for qualitative detection and differentiation of HIV-1 p24 antigen, HIV-1 (groups M and O) antibodies, and HIV-2 antibodies in human serum.  This assay is intended as an aid in the diagnosis of infection with HIV-1 and/or HIV-2, including acute (primary) HIV-1 infection. The test is validated for adult patients, including pregnant women, and in pediatric patients as young as two years of age. The performance of this assay has not been established for neonates.      ALT [[id number]]  (Normal) Collected: 01/31/24 0902    Lab Status: Final result Specimen: Blood from Arm, Left Updated: 01/31/24 0921     ALT 25 U/L                No orders to display              Procedures  Procedures         Medical Decision Making  Problems  Addressed:  Needlestick injury accident with exposure to body fluid: acute illness or injury    Amount and/or Complexity of Data Reviewed  Labs: ordered.        Disposition  Final diagnoses:   Needlestick injury accident with exposure to body fluid     Time reflects when diagnosis was documented in both MDM as applicable and the Disposition within this note       Time User Action Codes Description Comment    1/31/2024  9:11 AM Carlos Alvarez Add [W46.1XXA] Needlestick injury accident with exposure to body fluid           ED Disposition       ED Disposition   Discharge    Condition   Stable    Date/Time   Wed Jan 31, 2024  9:11 AM    Comment   Margot Torres discharge to home/self care.                   Follow-up Information    None         There are no discharge medications for this patient.      No discharge procedures on file.    PDMP Review       None              ED Provider  Electronically Signed by               Carlos Alvarez DO  01/31/24 144

## 2024-12-13 ENCOUNTER — DOCTOR'S OFFICE (OUTPATIENT)
Dept: URBAN - NONMETROPOLITAN AREA CLINIC 1 | Facility: CLINIC | Age: 24
Setting detail: OPHTHALMOLOGY
End: 2024-12-13
Payer: COMMERCIAL

## 2024-12-13 ENCOUNTER — APPOINTMENT (OUTPATIENT)
Dept: URGENT CARE | Facility: CLINIC | Age: 24
End: 2024-12-13

## 2024-12-13 ENCOUNTER — APPOINTMENT (OUTPATIENT)
Dept: LAB | Facility: CLINIC | Age: 24
End: 2024-12-13

## 2024-12-13 VITALS — HEIGHT: 55 IN

## 2024-12-13 DIAGNOSIS — Z02.1 PHYSICAL EXAM, PRE-EMPLOYMENT: ICD-10-CM

## 2024-12-13 DIAGNOSIS — H52.223: ICD-10-CM

## 2024-12-13 DIAGNOSIS — H52.13: ICD-10-CM

## 2024-12-13 DIAGNOSIS — Z02.1 PHYSICAL EXAM, PRE-EMPLOYMENT: Primary | ICD-10-CM

## 2024-12-13 LAB
MEV IGG SER QL IA: NORMAL
MUV IGG SER QL IA: NORMAL
RUBV IGG SERPL IA-ACNC: 61 IU/ML
VZV IGG SER QL IA: ABNORMAL

## 2024-12-13 PROCEDURE — 36415 COLL VENOUS BLD VENIPUNCTURE: CPT

## 2024-12-13 PROCEDURE — 86735 MUMPS ANTIBODY: CPT

## 2024-12-13 PROCEDURE — 86480 TB TEST CELL IMMUN MEASURE: CPT

## 2024-12-13 PROCEDURE — 86765 RUBEOLA ANTIBODY: CPT

## 2024-12-13 PROCEDURE — 86762 RUBELLA ANTIBODY: CPT

## 2024-12-13 PROCEDURE — 86787 VARICELLA-ZOSTER ANTIBODY: CPT

## 2024-12-13 PROCEDURE — 92014 COMPRE OPH EXAM EST PT 1/>: CPT | Performed by: OPTOMETRIST

## 2024-12-13 ASSESSMENT — REFRACTION_MANIFEST
OD_SPHERE: -4.00
OD_CYLINDER: -0.50
OS_SPHERE: -4.00
OD_VA2: 20/20
OS_VA2: 20/20
OS_VA1: 20/20
OD_VA1: 20/20
OD_AXIS: 120
OS_AXIS: 060
OU_VA: 20/20
OS_CYLINDER: -0.25

## 2024-12-13 ASSESSMENT — REFRACTION_CURRENTRX
OD_AXIS: 135
OS_AXIS: 057
OS_SPHERE: -4.00
OD_OVR_VA: 20/
OD_SPHERE: -4.00
OS_CYLINDER: -0.25
OS_OVR_VA: 20/
OS_VPRISM_DIRECTION: SV
OD_CYLINDER: -0.25
OD_VPRISM_DIRECTION: SV

## 2024-12-13 ASSESSMENT — REFRACTION_AUTOREFRACTION
OD_SPHERE: -3.50
OS_CYLINDER: -0.25
OS_SPHERE: -3.75
OD_CYLINDER: -0.75
OS_AXIS: 022
OD_AXIS: 112

## 2024-12-13 ASSESSMENT — CONFRONTATIONAL VISUAL FIELD TEST (CVF)
OS_FINDINGS: FULL
OD_FINDINGS: FULL

## 2024-12-13 ASSESSMENT — VISUAL ACUITY
OD_BCVA: 20/20
OS_BCVA: 20/20

## 2024-12-13 ASSESSMENT — CORNEAL SURGICAL SCARRING: OD_SCARRING: PERIPHERAL STROMAL

## 2024-12-13 ASSESSMENT — TONOMETRY
OS_IOP_MMHG: 16
OD_IOP_MMHG: 16

## 2024-12-15 LAB
GAMMA INTERFERON BACKGROUND BLD IA-ACNC: 0 IU/ML
M TB IFN-G BLD-IMP: NEGATIVE
M TB IFN-G CD4+ BCKGRND COR BLD-ACNC: 0 IU/ML
M TB IFN-G CD4+ BCKGRND COR BLD-ACNC: 0 IU/ML
MITOGEN IGNF BCKGRD COR BLD-ACNC: 10 IU/ML

## 2024-12-16 ENCOUNTER — RESULTS FOLLOW-UP (OUTPATIENT)
Dept: URGENT CARE | Facility: CLINIC | Age: 24
End: 2024-12-16

## 2025-01-31 ASSESSMENT — REFRACTION_MANIFEST
OD_SPHERE: -4.00
OD_VA2: 20/20
OS_SPHERE: -4.00
OS_VA2: 20/20
OS_VA1: 20/20
OD_CYLINDER: -0.50
OS_AXIS: 060
OS_CYLINDER: -0.25
OU_VA: 20/20
OD_VA1: 20/20
OD_AXIS: 120

## 2025-01-31 ASSESSMENT — REFRACTION_CURRENTRX
OD_CYLINDER: -0.25
OS_AXIS: 057
OD_VPRISM_DIRECTION: SV
OS_VPRISM_DIRECTION: SV
OD_OVR_VA: 20/
OS_CYLINDER: -0.25
OD_SPHERE: -4.00
OS_OVR_VA: 20/
OD_AXIS: 135
OS_SPHERE: -4.00

## 2025-05-01 ENCOUNTER — ULTRASOUND (OUTPATIENT)
Dept: OBGYN CLINIC | Facility: CLINIC | Age: 25
End: 2025-05-01

## 2025-05-01 ENCOUNTER — RESULTS FOLLOW-UP (OUTPATIENT)
Dept: OBGYN CLINIC | Facility: CLINIC | Age: 25
End: 2025-05-01

## 2025-05-01 ENCOUNTER — TRANSCRIBE ORDERS (OUTPATIENT)
Dept: LAB | Facility: CLINIC | Age: 25
End: 2025-05-01

## 2025-05-01 ENCOUNTER — APPOINTMENT (OUTPATIENT)
Dept: LAB | Facility: CLINIC | Age: 25
End: 2025-05-01
Payer: COMMERCIAL

## 2025-05-01 ENCOUNTER — APPOINTMENT (OUTPATIENT)
Dept: LAB | Facility: CLINIC | Age: 25
End: 2025-05-01
Attending: PREVENTIVE MEDICINE
Payer: COMMERCIAL

## 2025-05-01 VITALS
DIASTOLIC BLOOD PRESSURE: 78 MMHG | BODY MASS INDEX: 33.33 KG/M2 | SYSTOLIC BLOOD PRESSURE: 116 MMHG | WEIGHT: 207.4 LBS | HEIGHT: 66 IN

## 2025-05-01 DIAGNOSIS — Z32.01 ENCOUNTER FOR PREGNANCY TEST, RESULT POSITIVE: Primary | ICD-10-CM

## 2025-05-01 DIAGNOSIS — O36.80X0 PREGNANCY WITH UNCERTAIN FETAL VIABILITY, SINGLE OR UNSPECIFIED FETUS: ICD-10-CM

## 2025-05-01 DIAGNOSIS — Z00.8 HEALTH EXAMINATION IN POPULATION SURVEY: ICD-10-CM

## 2025-05-01 DIAGNOSIS — Z00.8 HEALTH EXAMINATION IN POPULATION SURVEY: Primary | ICD-10-CM

## 2025-05-01 LAB
ABO GROUP BLD: NORMAL
B-HCG SERPL-ACNC: ABNORMAL MIU/ML (ref 0–5)
BASOPHILS # BLD AUTO: 0.03 THOUSANDS/ÂΜL (ref 0–0.1)
BASOPHILS NFR BLD AUTO: 0 % (ref 0–1)
BLD GP AB SCN SERPL QL: NEGATIVE
CHOLEST SERPL-MCNC: 150 MG/DL (ref ?–200)
EOSINOPHIL # BLD AUTO: 0.25 THOUSAND/ÂΜL (ref 0–0.61)
EOSINOPHIL NFR BLD AUTO: 3 % (ref 0–6)
ERYTHROCYTE [DISTWIDTH] IN BLOOD BY AUTOMATED COUNT: 13.4 % (ref 11.6–15.1)
EST. AVERAGE GLUCOSE BLD GHB EST-MCNC: 97 MG/DL
HBA1C MFR BLD: 5 %
HCT VFR BLD AUTO: 44.3 % (ref 34.8–46.1)
HDLC SERPL-MCNC: 56 MG/DL
HGB BLD-MCNC: 13.7 G/DL (ref 11.5–15.4)
IMM GRANULOCYTES # BLD AUTO: 0.03 THOUSAND/UL (ref 0–0.2)
IMM GRANULOCYTES NFR BLD AUTO: 0 % (ref 0–2)
LDLC SERPL CALC-MCNC: 79 MG/DL (ref 0–100)
LYMPHOCYTES # BLD AUTO: 3.14 THOUSANDS/ÂΜL (ref 0.6–4.47)
LYMPHOCYTES NFR BLD AUTO: 32 % (ref 14–44)
MCH RBC QN AUTO: 26.6 PG (ref 26.8–34.3)
MCHC RBC AUTO-ENTMCNC: 30.9 G/DL (ref 31.4–37.4)
MCV RBC AUTO: 86 FL (ref 82–98)
MONOCYTES # BLD AUTO: 0.66 THOUSAND/ÂΜL (ref 0.17–1.22)
MONOCYTES NFR BLD AUTO: 7 % (ref 4–12)
NEUTROPHILS # BLD AUTO: 5.71 THOUSANDS/ÂΜL (ref 1.85–7.62)
NEUTS SEG NFR BLD AUTO: 58 % (ref 43–75)
NONHDLC SERPL-MCNC: 94 MG/DL
NRBC BLD AUTO-RTO: 0 /100 WBCS
PLATELET # BLD AUTO: 263 THOUSANDS/UL (ref 149–390)
PMV BLD AUTO: 10.9 FL (ref 8.9–12.7)
RBC # BLD AUTO: 5.15 MILLION/UL (ref 3.81–5.12)
RH BLD: POSITIVE
SL AMB POCT URINE HCG: POSITIVE
SPECIMEN EXPIRATION DATE: NORMAL
TRIGL SERPL-MCNC: 75 MG/DL (ref ?–150)
WBC # BLD AUTO: 9.82 THOUSAND/UL (ref 4.31–10.16)

## 2025-05-01 PROCEDURE — 84702 CHORIONIC GONADOTROPIN TEST: CPT

## 2025-05-01 PROCEDURE — 80061 LIPID PANEL: CPT

## 2025-05-01 PROCEDURE — 85025 COMPLETE CBC W/AUTO DIFF WBC: CPT

## 2025-05-01 PROCEDURE — 86850 RBC ANTIBODY SCREEN: CPT

## 2025-05-01 PROCEDURE — 86900 BLOOD TYPING SEROLOGIC ABO: CPT

## 2025-05-01 PROCEDURE — 86901 BLOOD TYPING SEROLOGIC RH(D): CPT

## 2025-05-01 PROCEDURE — 36415 COLL VENOUS BLD VENIPUNCTURE: CPT

## 2025-05-01 PROCEDURE — 83036 HEMOGLOBIN GLYCOSYLATED A1C: CPT

## 2025-05-01 NOTE — PROGRESS NOTES
Assessment & Plan  Encounter for pregnancy test, result positive    Orders:    US OB < 14 weeks with transvaginal; Future    POCT urine HCG    AMB US OB < 14 weeks single or first gestation level 1    Pregnancy with uncertain fetal viability, single or unspecified fetus    Orders:    hCG, quantitative; Standing    CBC and differential; Future    Type and screen; Future    US OB < 14 weeks with transvaginal; Future    Reviewed early IUP vs failed IUP vs ectopic pregnancy  Patient advised to call with pain or bleeding  US in radiology today/ tomorrow  She was advised to continue to take prenatal vitamins.   Beta HCG today and repeat Saturday  Patient to [facility] OB list   F/u in 2 weeks for repeat D&V pending labs/ US at radiology  I have spent a total time of 45 minutes on 25 in caring for this patient including Impressions, Counseling / Coordination of care, Documenting in the medical record, and Obtaining or reviewing history  .             Subjective  Patient ID: Margot Torres is a 24 y.o. female here for amenorrhea    Patient is unsure of LMP.    She originally thought her LMP was in February but then looked at calendar and thinks she had menses in March.   Patient with irregular menses.    Menstrual cycle: 28-35 cycle length:  6 days  Pregnancy was unplanned, but desired.   She has  started taking a prenatal vitamin    Signs and symptoms of pregnancy:   Breast tenderness: no  Fatigue: no  Cramping or Pelvic Pain: no  Spotting or Vaginal Bleeding: no  Nausea or vomiting: no      OB History    Para Term  AB Living   0 0 0 0 0 0   SAB IAB Ectopic Multiple Live Births   0 0 0 0 0        The following portions of the patient's history were reviewed and updated as appropriate: allergies, current medications, past family history, past medical history, past social history, past surgical history, and problem list.    Perinent hx that may affect pregnancy:  none      Review of Systems   Constitutional:  "Negative.    HENT: Negative.    Eyes: Negative.    Respiratory: Negative.    Cardiovascular: Negative.    Gastrointestinal: Negative.    Endocrine: Negative.    Genitourinary:        As noted in HPI   Musculoskeletal: Negative.    Skin: Negative.    Allergic/Immunologic: Negative.    Neurological: Negative.    Hematological: Negative.    Psychiatric/Behavioral: Negative      See HPI for pertinent positives.               /78 (BP Location: Right arm, Patient Position: Sitting, Cuff Size: Standard)   Ht 5' 6\" (1.676 m)   Wt 94.1 kg (207 lb 6.4 oz)   LMP 2025 (Approximate)   BMI 33.48 kg/m²         FIRST TRIMESTER OBSTETRIC ULTRASOUND     LMP unsure  INDICATION: Establish Gestational Age       FINDINGS:  Fetal pole: not seen  Gestational sac =1.15cm = 5w2d  YOLK SAC:  not seen  AMNIOTIC FLUID/SAC SHAPE:  Within expected normal range.     UTERUS/ADNEXA:   No adnexal mass or pathologic cyst.  No free fluid identified.    IMPRESSION:    Gestational sac seen measuring 5w2d  No fetal pole or yolk sac seen  Early IUP vs failed IUP vs ectopic pregnancy    Joseph Mcdonald PA-C  OB/[facility]N [facility]  [address]   [place] [address]  Dept: [phone]  Dept Fax: [phone]  Loc Appt: [phone]  Loc: [phone]  Loc Fax: [phone]  Ultrasound Probe Disinfection    A transvaginal ultrasound was performed.   Prior to use, disinfection was performed with High Level Disinfection Process ([employer]).  Probe serial number : [id number] was used.      Chaperone was present during exam.    Vitals:    25 0841   BP: 116/78         External genitalia: no lesions, no discoloration  Urethra: no discharge or erythema  Bladder: nontender, good support  Vagina: no discharge, no lesions  Cervix: no lesions, no cervical motion tenderness  Uterus: NT, normal size and shape  Adnexa: nontender, no masses    "

## 2025-05-02 ENCOUNTER — HOSPITAL ENCOUNTER (OUTPATIENT)
Dept: ULTRASOUND IMAGING | Facility: HOSPITAL | Age: 25
Discharge: HOME | End: 2025-05-02
Attending: PHYSICIAN ASSISTANT
Payer: COMMERCIAL

## 2025-05-02 ENCOUNTER — TELEPHONE (OUTPATIENT)
Age: 25
End: 2025-05-02

## 2025-05-02 DIAGNOSIS — O36.80X0 PREGNANCY WITH UNCERTAIN FETAL VIABILITY, SINGLE OR UNSPECIFIED FETUS: ICD-10-CM

## 2025-05-02 DIAGNOSIS — Z32.01 ENCOUNTER FOR PREGNANCY TEST, RESULT POSITIVE: ICD-10-CM

## 2025-05-02 PROCEDURE — 76801 OB US < 14 WKS SINGLE FETUS: CPT

## 2025-05-02 NOTE — TELEPHONE ENCOUNTER
Spoke with patient to review US findings.  Early IUP + FHT.    Patient will keep f/u visit as scheduled in 2 weeks and will repeat dating scan at that time.  Patient will not repeat labs tomorrow as originally planned.    FINDINGS:     A single live intrauterine gestation is identified.  Cardiac activity is present. Heart rate of 119 bpm.     YOLK SAC: Present and normal in size and appearance.  MEAN GESTATIONAL SAC SIZE: 18.0 mm = 6w 1d  MEAN CROWN RUMP LENGTH: 2.0 mm = 5w 5d  FETAL ANATOMY: Appropriate for gestational age.  AMNIOTIC FLUID/SAC SHAPE: Within expected normal range.  PLACENTA: The placenta can not be adequately assessed at this early gestational age.     There is no significant subchorionic fluid collection.     UTERUS/ADNEXA:  The uterus and ovaries are within normal limits. There is a right-sided corpus luteum.  The cervix remains closed.  Mild free fluid present.     IMPRESSION:     Single live intrauterine gestation at 6 weeks (range +/- 4 days).     KALEIGH of 12/26/2025.

## 2025-05-14 ENCOUNTER — NURSE TRIAGE (OUTPATIENT)
Age: 25
End: 2025-05-14

## 2025-05-14 DIAGNOSIS — O21.9 NAUSEA AND VOMITING IN PREGNANCY: ICD-10-CM

## 2025-05-14 DIAGNOSIS — O21.9 NAUSEA AND VOMITING IN PREGNANCY: Primary | ICD-10-CM

## 2025-05-14 RX ORDER — METOCLOPRAMIDE 10 MG/1
10 TABLET ORAL 4 TIMES DAILY
Qty: 30 TABLET | Refills: 0 | Status: SHIPPED | OUTPATIENT
Start: 2025-05-14 | End: 2025-05-14 | Stop reason: CLARIF

## 2025-05-14 RX ORDER — METOCLOPRAMIDE 10 MG/1
10 TABLET ORAL 4 TIMES DAILY
Qty: 30 TABLET | Refills: 0 | Status: SHIPPED | OUTPATIENT
Start: 2025-05-14

## 2025-05-14 NOTE — TELEPHONE ENCOUNTER
If patient has continued to vomit and not keeping anything down she should go to ER for fluids.   I will send in a prescription for reglan in the meantime

## 2025-05-14 NOTE — TELEPHONE ENCOUNTER
"FOLLOW UP: Routing to provider for recommendations    REASON FOR CONVERSATION: Morning Sickness    SYMPTOMS: vomiting     OTHER: Patient is 7 weeks 5 days pregnant called office complaining of nausea and vomiting in pregnancy. States nausea has been happening for 2 weeks , however vomiting started at 10 am today and she had 4-5 episodes and nausea has been severe today. She has been unable to keep any food or fluid down. She is currently taking unisom and vitamin B6 without relief. She also has a headache that started this morning. She denies decreased urination, dry mouth or lips, weight loss and states she does not feel like she is dehydrated. She state she has had intermittent lightheadedness for 2 weeks but does not feel like she will lose consciousness and is worse when getting up. Advised she can take Tylenol for headache. Advised Drink clear fluids in small amounts for 8 hours. Water or ice chips can be taken. Sports-rehydration liquids are also good. Other options include Pedialyte popsicles, half-strength flat lemon-lime soda, or half-strength ginger ale, and peppermint tea.Advised sip small amounts frequently. After 4 hours without vomiting, increase the amount.   Advised begin eating bland foods after 8 hours without vomiting. Start with saltine crackers, ginger snap cookies, white bread, rice, mashed potatoes, cereal, or apple sauce. Slowly return to a diet in the next 24 hours.   Will reach out to provider for further recommendations and if nausea medication can be ordered.       DISPOSITION: Discuss with Provider and Call Back Patient (overriding Home Care)              Reason for Disposition   Nausea or vomiting    Answer Assessment - Initial Assessment Questions  1. SEVERITY - NAUSEA: \"How bad is the nausea?\" (e.g., none; mild, moderate, severe)      Severe   2. SEVERITY - VOMITING: \"Are you vomiting?\" If Yes, ask: \"How many times have you vomited in the past 24 hours?\" (e.g., nausea only; mild, " "moderate or severe vomiting)      4-5 times   3. ONSET: \"When did the nausea or vomiting begin?\"       10 am   4. FLUIDS: \"What fluids or food have you vomited up today?\" \"Are you able to keep any liquids down?\"      No   5. TREATMENT: \"What have you been doing so far to treat this?\"       No   6. DEHYDRATION: \"When was the last time you urinated?\" \"Are you feeling lightheaded?\" \"Weight loss?\"      Urinated - 2 hours. Intermittent lightheaded 2 weeks.    7. PREGNANCY: \"How many weeks pregnant are you?\" \"How has the pregnancy been going?\"      7 weeks 5 days   8. KALEIGH: \"What date are you expecting to deliver?\"      12/26/25  9. MEDICINES: \"What medicines are you taking?\" (e.g., iron, opioid pain medicines, prenatal vitamins, vitamin B6)      PNV , vitamin B6 and unisom    10. OTHER SYMPTOMS: \"Do you have any other symptoms?\" (e.g., diarrhea, fever) fatigued increased today    Protocols used: Pregnancy - Morning Sickness (Nausea and Vomiting of Pregnancy)-Adult-[place]    "

## 2025-05-14 NOTE — TELEPHONE ENCOUNTER
Patient returning a call. RN provided recommendations. Patient agreeable to plan, but asking that medication is sent to a different pharmacy as the one on file closes in 10 minutes and she is over a half hour away so will not be able to pick it up. RN added [facility] pharmacy in [place] to chart as per patient request; Re-ordered reglan medication as per Rx refill protocol.

## 2025-05-14 NOTE — TELEPHONE ENCOUNTER
"Reason for Disposition  • Prescription prescribed recently is not at pharmacy and triager has access to patient's EMR and prescription is recorded in the EMR    Answer Assessment - Initial Assessment Questions  1. NAME of MEDICINE: \"What medicine(s) are you calling about?\"      Reglan  2. QUESTION: \"What is your question?\" (e.g., double dose of medicine, side effect)      Change pharmacy  3. PRESCRIBER: \"Who prescribed the medicine?\" Reason: if prescribed by specialist, call should be referred to that group.      Joseph Mcdonald PA-C    Protocols used: Medication Question Call-Adult-[place]    "

## 2025-05-19 ENCOUNTER — RESULTS FOLLOW-UP (OUTPATIENT)
Dept: OBGYN CLINIC | Facility: CLINIC | Age: 25
End: 2025-05-19

## 2025-05-19 ENCOUNTER — ULTRASOUND (OUTPATIENT)
Dept: OBGYN CLINIC | Facility: CLINIC | Age: 25
End: 2025-05-19

## 2025-05-19 VITALS
HEIGHT: 66 IN | SYSTOLIC BLOOD PRESSURE: 112 MMHG | WEIGHT: 198.6 LBS | BODY MASS INDEX: 31.92 KG/M2 | DIASTOLIC BLOOD PRESSURE: 80 MMHG

## 2025-05-19 DIAGNOSIS — O21.9 NAUSEA AND VOMITING IN PREGNANCY: Primary | ICD-10-CM

## 2025-05-19 DIAGNOSIS — Z34.90 EARLY STAGE OF PREGNANCY: ICD-10-CM

## 2025-05-19 DIAGNOSIS — O36.80X0 ENCOUNTER TO DETERMINE FETAL VIABILITY OF PREGNANCY, SINGLE OR UNSPECIFIED FETUS: ICD-10-CM

## 2025-05-19 RX ORDER — PROMETHAZINE HYDROCHLORIDE 12.5 MG/1
12.5 TABLET ORAL EVERY 6 HOURS PRN
Qty: 30 TABLET | Refills: 0 | Status: SHIPPED | OUTPATIENT
Start: 2025-05-19

## 2025-05-19 NOTE — PROGRESS NOTES
Assessment & Plan  Encounter to determine fetal viability of pregnancy, single or unspecified fetus    Orders:    AMB US OB < 14 weeks single or first gestation level 1    Nausea and vomiting in pregnancy    Orders:    promethazine (PHENERGAN) 12.5 MG tablet; Take 1 tablet (12.5 mg total) by mouth every 6 (six) hours as needed for nausea or vomiting    Early stage of pregnancy    Orders:    Ambulatory Referral to Maternal Fetal Medicine; Future    SIUP confirmed with KALEIGH 25 by US today  F/u in 3 weeks for ob intake and ob exam  Patient referred to maternal-fetal medicine for first trimester ultrasound, genetic screening if desired level 2 ultrasound.   Discussed with patient pregnancy warning signs and to call if with pregnancy problems or concerns.   She was advised to continue to take prenatal vitamins.   Patient to [facility] OB list   I have spent a total time of 20 minutes on 25 in caring for this patient including Impressions, Documenting in the medical record, and Obtaining or reviewing history  .             Subjective  Patient ID: Margot Torres is a 24 y.o. female here for amenorrhea    Patient here for f/u US to confirm dating.  US at radiology 25 CRL: 2mm 5w5d    LMP 3/12/25=  9w5d    She has  started taking a prenatal vitamin      Signs and symptoms of pregnancy:     Cramping or Pelvic Pain: no  Spotting or Vaginal Bleeding: no  Nausea or vomiting: + nausea, some days will vomit several times.  Not vomiting every day but when she does it is several times throughout day.  Does not feel the reglan helped.      OB History    Para Term  AB Living   0 0 0 0 0 0   SAB IAB Ectopic Multiple Live Births   0 0 0 0 0        The following portions of the patient's history were reviewed and updated as appropriate: allergies, current medications, past family history, past medical history, past social history, past surgical history, and problem list.    Perinent hx that may affect  pregnancy:  none      Review of Systems   Constitutional: Negative.    HENT: Negative.    Eyes: Negative.    Respiratory: Negative.    Cardiovascular: Negative.    Gastrointestinal: Negative.    Endocrine: Negative.    Genitourinary:        As noted in HPI   Musculoskeletal: Negative.    Skin: Negative.    Allergic/Immunologic: Negative.    Neurological: Negative.    Hematological: Negative.    Psychiatric/Behavioral: Negative      See HPI for pertinent positives.               LMP 2025 (Approximate)         FIRST TRIMESTER OBSTETRIC ULTRASOUND     LMP 3/12/25  INDICATION: Establish Gestational Age       FINDINGS:  A single intrauterine gestation is identified.  Cardiac activity is detected at 163 bpm.      YOLK SAC:  Present and normal in size and appearance.  MEAN CROWN RUMP LENGTH:  1.71 cm = 8 weeks 1 days   AMNIOTIC FLUID/SAC SHAPE:  Within expected normal range.     UTERUS/ADNEXA:   No adnexal mass or pathologic cyst.  No free fluid identified.    IMPRESSION:    Single intrauterine pregnancy of 8 weeks 1 days gestational age  Fetal cardiac activity detected.  No adnexal masses seen.  EDC by LMP: 25  EDC by this Ultrasound: 26    Assigning a Final KALEIGH  Please choose how you are assigning the KALEIGH: The gestational age by LMP is </= 8w 6d and demonstrates more than 5 days days difference from the gestational age by CRL, therefore the final KALEIGH will be based on the ultrasound at this encounter    Final KALEIGH: 25 by ultrasound at this encounter.    Joseph Mcdonald PA-C  OB/GYN COMPLETE WOMENS CARE  [facility]  [address] [place] [address] DR KAY [address] [place] [id number]  Dept: [phone]  Dept Fax: [phone]  Loc Appt: [phone]  Loc: [phone]  Loc Fax: [phone]  Ultrasound Probe Disinfection    A transvaginal ultrasound was performed.   Prior to use, disinfection was performed with High Level Disinfection Process (Plumon).  Probe serial number :  [id number] was used.

## 2025-05-20 ENCOUNTER — TELEPHONE (OUTPATIENT)
Age: 25
End: 2025-05-20

## 2025-05-27 ENCOUNTER — TELEPHONE (OUTPATIENT)
Dept: OBGYN CLINIC | Facility: CLINIC | Age: 25
End: 2025-05-27

## 2025-05-27 NOTE — TELEPHONE ENCOUNTER
Called pt and provided her with the phone number for Maternal Fetal Medicine to schedule her first appointment with their office. Maternal Fetal Medicine had attempted to reach her to schedule and appt.

## 2025-06-12 ENCOUNTER — NURSE TRIAGE (OUTPATIENT)
Dept: OTHER | Facility: OTHER | Age: 25
End: 2025-06-12

## 2025-06-13 ENCOUNTER — INITIAL PRENATAL (OUTPATIENT)
Dept: OBGYN CLINIC | Facility: CLINIC | Age: 25
End: 2025-06-13
Payer: COMMERCIAL

## 2025-06-13 VITALS
WEIGHT: 199 LBS | HEIGHT: 66 IN | DIASTOLIC BLOOD PRESSURE: 76 MMHG | SYSTOLIC BLOOD PRESSURE: 122 MMHG | BODY MASS INDEX: 31.98 KG/M2

## 2025-06-13 VITALS — BODY MASS INDEX: 32.05 KG/M2 | HEIGHT: 66 IN

## 2025-06-13 DIAGNOSIS — O21.9 NAUSEA AND VOMITING IN PREGNANCY: ICD-10-CM

## 2025-06-13 DIAGNOSIS — O09.91 SUPERVISION OF HIGH RISK PREGNANCY IN FIRST TRIMESTER: Primary | ICD-10-CM

## 2025-06-13 DIAGNOSIS — Z34.02 ENCOUNTER FOR SUPERVISION OF NORMAL FIRST PREGNANCY IN SECOND TRIMESTER: ICD-10-CM

## 2025-06-13 DIAGNOSIS — O99.211 OBESITY AFFECTING PREGNANCY IN FIRST TRIMESTER, UNSPECIFIED OBESITY TYPE: ICD-10-CM

## 2025-06-13 DIAGNOSIS — Z3A.11 11 WEEKS GESTATION OF PREGNANCY: ICD-10-CM

## 2025-06-13 DIAGNOSIS — Z3A.11 11 WEEKS GESTATION OF PREGNANCY: Primary | ICD-10-CM

## 2025-06-13 DIAGNOSIS — Z11.3 SCREEN FOR STD (SEXUALLY TRANSMITTED DISEASE): ICD-10-CM

## 2025-06-13 PROCEDURE — PNV: Performed by: OBSTETRICS & GYNECOLOGY

## 2025-06-13 PROCEDURE — 87591 N.GONORRHOEAE DNA AMP PROB: CPT | Performed by: OBSTETRICS & GYNECOLOGY

## 2025-06-13 PROCEDURE — OBC

## 2025-06-13 PROCEDURE — 87491 CHLMYD TRACH DNA AMP PROBE: CPT | Performed by: OBSTETRICS & GYNECOLOGY

## 2025-06-13 PROCEDURE — 76815 OB US LIMITED FETUS(S): CPT | Performed by: OBSTETRICS & GYNECOLOGY

## 2025-06-13 RX ORDER — ONDANSETRON 4 MG/1
4 TABLET, FILM COATED ORAL EVERY 6 HOURS PRN
Qty: 30 TABLET | Refills: 0 | Status: SHIPPED | OUTPATIENT
Start: 2025-06-13

## 2025-06-13 RX ORDER — ASPIRIN 81 MG/1
162 TABLET, CHEWABLE ORAL DAILY
Qty: 180 TABLET | Refills: 1 | Status: SHIPPED | OUTPATIENT
Start: 2025-06-13

## 2025-06-13 RX ORDER — DIPHENHYDRAMINE HYDROCHLORIDE 25 MG/1
CAPSULE ORAL DAILY
COMMUNITY

## 2025-06-13 NOTE — PATIENT INSTRUCTIONS
Congratulations!! Please review our Pregnancy Essential Guide and [facility] L&D Virtual tour from our networks website.     [facility] Pregnancy Essentials Guide  [facility] ([email or web address])       [email or web address]         Laine LOPEZ RN  OB Nurse Navigator

## 2025-06-13 NOTE — TELEPHONE ENCOUNTER
"REASON FOR CONVERSATION: Tick Bite    SYMPTOMS: Large tick (assumed to be a wood tick) removed completely from the back of arm. No symptoms at this time    OTHER HEALTH INFORMATION: 11w4d pregnant    PROTOCOL DISPOSITION: Home Care    CARE ADVICE PROVIDED: Clean area and monitor for fever or bullseye rash.     Of note patient has an appt with OB tomorrow (6/13).     PRACTICE FOLLOW-UP: none          Reason for Disposition   Wood tick bite with no complications    Answer Assessment - Initial Assessment Questions  1. ATTACHED:  \"Is the tick still on the skin?\"  (e.g., yes, no, unsure)      No the tick was able to be completely removed from the back of arm    2. ONSET - TICK STILL ATTACHED:  \"How long do you think the tick has been on your skin?\" (e.g., hours, days, unsure)  Note:  Is there a recent activity (camping, hiking) where the caller may have been exposed?      Unsure, but the tick was embedded     4. LOCATION: \"Where is the tick bite located?\" (e.g., arm, leg)      Back of arm    5. TYPE of TICK: \"Is it a wood tick or a deer tick?\" (e.g., deer tick, wood tick; unsure)      Suspects it was a wood tick    8. OTHER SYMPTOMS: \"Do you have any other symptoms?\" (e.g., fever, rash, redness at bite area, red ring around bite)      Denies    9. PREGNANCY: \"Is there any chance you are pregnant?\" \"When was your last menstrual period?\"      11w4d    Protocols used: Tick Bite-Adult-    "

## 2025-06-13 NOTE — TELEPHONE ENCOUNTER
"Regardin weeks pregnant/tick on arm  ----- Message from Nirmala MAC sent at 2025  8:24 PM EDT -----  Patient stated, \"I am 12 weeks pregnant and found a tick on me in the last 15 mins. It was a big tick, theres a red slava at the back of my arm, and it was definitely embedded. I want to know what to do now.\"    "

## 2025-06-13 NOTE — ASSESSMENT & PLAN NOTE
SLIUP seen, has MFM NT scheduled  Discussed zofran for nausea, rx sent  Discussed tick bite overall low risk given short period of time attached, can monitor sx and f/u with us/PCP as needed  OB precautions reviewed

## 2025-06-13 NOTE — PROGRESS NOTES
OB INTAKE INTERVIEW 2025    Patient is 24 y.o. who presents for OB intake at 11w5d.  She is not accompanied by anyone during this encounter.  The father of her baby (Gene) is involved in the pregnancy.      Patient's last menstrual period was 2025 (approximate).  Ultrasound: Measured 8 weeks 1 days on 2025  Estimated Date of Delivery: 25 changed by dating ultrasound.    Signs/Symptoms of Pregnancy:  Current pregnancy symptoms: nausea, vomiting, and constipation  Constipation YES- occasional   Headaches YES- tylenol prn- helps  Cramping/spotting YES- cramping   PICA cravings no    Diabetes:  There is no height or weight on file to calculate BMI.  If patient has 1 or more, please order early 1 hour GTT  History of GDM no  BMI >30 YES  BMI >35 no  History of PCOS or current metformin use no  History of LGA/macrosomic infant (4000g/9lbs) no    If patient has 2 or more, please order early 1 hour GTT  BMI>30 YES  AMA no  First degree relative with type 2 diabetes no  History of chronic HTN, hyperlipidemia, elevated A1C no  High risk race (, , ,  or ) no    Hypertension: if you answer yes to any of the following, please order baseline preeclampsia labs (cbc, comprehensive metabolic panel, urine protein creatinine ratio, uric acid)  History of of chronic HTN no  History of gestational HTN no  History of preeclampsia, eclampsia, or HELLP syndrome no  History of diabetes no  History of lupus, autoimmune disease, kidney disease no    Thyroid: if yes order TSH with reflex T4  History of thyroid disease no    Bleeding Disorder or Hx of DVT - patient or first degree relative with history of. Order the following if not done previously.   (Factor V, antithrombin III, prothrombin gene mutation, protein C and S Ag, lupus anticoagulant, anticardiolipin, beta-2 glycoprotein)   YES- father- hx of dvt- in the last 6 years.  OB/GYN-  History of  "abnormal pap smear no       Date of last pap smear 10/16/2023  History of HPV YES  History of Herpes/HSV no  History of other STI (gonorrhea, chlamydia, trich) no  History of prior  no  History of prior  no  History of  delivery prior to 36 weeks 6 days no  History of Varicella or Vaccination vaccinated  History of blood transfusion no  Ok for blood transfusion YES    Substance screening:   History of tobacco use YES- former vape use  Currently using tobacco no  Currently using alcohol no  Presently using drugs no  Past drug use  no  IV drug use- no  Partner drug use no  Parent/Family drug use YES- fob- parents  Substance Use Screen Level N/A    MRSA Screening:   Does the pt have a hx of MRSA? no    Mental Health:  Hx of/or current dx of depression: no, Anxiety: YES,  Medications: no   EPDS Screen:  Negative / score 2     Dental Health:  Patient has seen a dentist in the past 6 months YES    Immunizations:  Influenza vaccine given this season YES   Discussed Tdap vaccine YES   Discussed COVID Vaccine YES     Genetic/MFM:  Do you or your partner have a history of any of the following in yourselves or first degree relatives?  Cystic fibrosis no  Spinal muscular atrophy no  Hemoglobinopathy/Sickle Cell/Thalassemia no  Fragile X Intellectual Disability no    If yes, discuss Carrier Screening and recommend consultation with MFM/Genetic Counseling and place specific MFM Referral for.    Discussed Carrier Screening being completed once in a lifetime as a standard of care lab test. Place orders for Cystic Fibrosis Gene Test (YGF326) and Spinal Muscular Atrophy DNA ([id number]). Cost will be based on your medical policy, deductible, and co-insurance. Billing is done directly with Mama and your insurance.  If you have questions please reach out to Mama directly [phone]. They may ask you for CPT codes,  CF is 01062 and SMA is 29795.\" Patient will check with insurance desire testing for Cystic " Fibrosis and Spinal Muscular Atrophy.    ACOG Patient Education Cystic Fibrosis and Spinal Muscular Atrophy prenatal screening given.    Appointment for Nuchal Translucency Ultrasound at [facility] is scheduled for 6/20.    Interview education:  [facility] Pregnancy Essentials Book reviewed, discussed and attached to their AVS YES     Nurse/Family Partnership-patient may qualify NO; referral placed NO     Prenatal lab work scripts YES    Extra labs ordered: 1 hour GTT    Aspirin/Preeclampsia Screen      Risk Level Risk Factor Recommendation   LOW Prior Uncomplicated full-term delivery no No Aspirin recommendation        MODERATE Nulliparity YES Recommend low-dose aspirin if     BMI>30 YES 2 or more moderate risk factors    Family History Preeclampsia (mother/sister) no     35yr old or greater no     Black Race, Concern for SDOH/Low Socioeconomic no     IVF Pregnancy  no     Personal History Risks (low birth weight, prior adverse preg outcome, >10yr preg interval) no         HIGH History of Preeclampsia no Recommend low-dose aspirin if     Multifetal gestation no 1 or more high risk factors    Chronic HTN no     Type 1 or 2 Diabetes no     Renal Disease no     Autoimmune Disease  no          Contraindications to ASA therapy:  NSAID/ ASA allergy: no  Nasal polyps: no  Asthma with history of ASA induced bronchospasm: no    Relative contraindications:  History of GI bleed: no  Active peptic ulcer disease: no  Severe hepatic dysfunction: no    Patient does meet recommendation to take ASA 162mg during this pregnancy from 12-36 wks to lower her risk of preeclampsia.  Instructions given and patient verbalized understanding.    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 Allison called into the  pod yesterday- in regard to a tick bite- that might have been a wood tick. She has a red slava on the back of her right arm. She also has been experiencing nausea. She has tried a couple medications and they have not been helpful to alleviate. Margot Maternal grandmother has cerebellum ataxia, her father has not completed testing in regard to this matter. Margot's father also had a DVT in the last 6 years patient states. Her father has not performed testing in regard to bleeding disorders. He was around the age 45 when this occurred. Spoke with dr. Swenson to order panel. Pn1 labs ordered and reviewed. Early one hour gtt ordered and reviewed. Blue folder given and reviewed.    PN1 visit scheduled. The patient was oriented to our practice, the navigator role, reviewed delivering physicians and [facility] for delivery. All questions were answered.    Interviewed by: Laine Mathis RN

## 2025-06-13 NOTE — PROGRESS NOTES
Subjective   Patient ID: Margot Torres is a 24 y.o. female.    Patient is here for a visit - OB H&P    Chief Complaint   Patient presents with    Initial Prenatal Visit     Pt would like baby aspirin sent to pharmacy on file. Review nausea as medication is not helpful phenergan makes pt more tired, b6/unisom has not been helpful, reglan (has been taking prn but does not help much). Also pt had called into POD for Tick bite on arm yesterday     Had intake today, labs ordered  Pap 10/2023 NILM  MFM NT appt scheduled next week     N/V improving although still an issue. Vomiting is no longer daily  Is able to keep food/liquids down better   Occasional cramping, no VB     Thinks tick only attached 1 hour or so, denies any fever or rash     Menstrual History:  OB History          1    Para   0    Term   0       0    AB   0    Living   0         SAB   0    IAB   0    Ectopic   0    Multiple   0    Live Births   0           Obstetric Comments   Menarche, 10                Patient's last menstrual period was 2025 (approximate).         Past Medical History[1]    Past Surgical History[2]    Social History[3]     No Known Allergies    Current Medications[4]      Review of Systems   Constitutional:  Negative for appetite change, chills and fever.   Eyes:  Negative for visual disturbance.   Respiratory:  Negative for cough, chest tightness and shortness of breath.    Cardiovascular:  Negative for chest pain.   Gastrointestinal:  Positive for nausea and vomiting. Negative for abdominal distention, abdominal pain, constipation and diarrhea.   Endocrine: Negative for cold intolerance and heat intolerance.   Genitourinary:  Negative for difficulty urinating, dyspareunia, dysuria, frequency, genital sores, pelvic pain, urgency, vaginal bleeding, vaginal discharge and vaginal pain.   Musculoskeletal:  Negative for arthralgias.   Neurological:  Negative for light-headedness and headaches.   Hematological:   "Does not bruise/bleed easily.   Psychiatric/Behavioral:  Negative for behavioral problems.    All other systems reviewed and are negative.        /76   Ht 5' 6\" (1.676 m)   Wt 90.3 kg (199 lb)   LMP 03/12/2025 (Approximate)   BMI 32.12 kg/m²       Physical Exam  Constitutional:       General: She is not in acute distress.     Appearance: Normal appearance. She is not ill-appearing.   Genitourinary:      Bladder and urethral meatus normal.      Right Labia: No rash, tenderness, lesions or skin changes.     Left Labia: No tenderness, lesions, skin changes or rash.     No labial fusion noted.      No inguinal adenopathy present in the right or left side.     No vaginal discharge, erythema, tenderness, bleeding or ulceration.        Right Adnexa: not tender, not full and no mass present.     Left Adnexa: not tender, not full and no mass present.     No cervical motion tenderness, discharge, friability, lesion, polyp or eversion.      Uterus is enlarged.      Uterus is not fixed, tender or irregular.      No uterine mass detected.     Pelvic exam was performed with patient in the lithotomy position.   HENT:      Head: Normocephalic.     Cardiovascular:      Rate and Rhythm: Normal rate.      Heart sounds: Normal heart sounds.   Pulmonary:      Effort: Pulmonary effort is normal. No accessory muscle usage or respiratory distress.   Abdominal:      General: There is no distension.      Palpations: Abdomen is soft. There is no mass.      Tenderness: There is no abdominal tenderness. There is no guarding or rebound.     Musculoskeletal:         General: Normal range of motion.      Cervical back: No rigidity.      Comments: Right posterior upper arm small tick bite with no rash, no cellulitic changes    Lymphadenopathy:      Lower Body: No right inguinal adenopathy. No left inguinal adenopathy.     Neurological:      General: No focal deficit present.      Mental Status: She is alert. Mental status is at baseline. "     Skin:     General: Skin is warm and dry.     Psychiatric:         Mood and Affect: Mood normal.         Behavior: Behavior normal.   Vitals and nursing note reviewed. Exam conducted with a chaperone present.         Limited transabdominal US with SLIUP,  bpm       Appropriate laboratory testing, imaging studies, and prior external records were reviewed:     Assessment/Plan:       Problem List Items Addressed This Visit       11 weeks gestation of pregnancy    Obesity affecting pregnancy in first trimester    Supervision of high risk pregnancy in first trimester - Primary    SLIUP seen, has MFM NT scheduled  Discussed zofran for nausea, rx sent  Discussed tick bite overall low risk given short period of time attached, can monitor sx and f/u with us/PCP as needed  OB precautions reviewed         Relevant Medications    aspirin 81 mg chewable tablet     Other Visit Diagnoses         Nausea and vomiting in pregnancy        Relevant Medications    ondansetron (ZOFRAN) 4 mg tablet      Screen for STD (sexually transmitted disease)        Relevant Orders    Chlamydia/GC amplified DNA by PCR                         [1]   Past Medical History:  Diagnosis Date    Abnormal Pap smear of cervix     hpv    Varicella     vacccinated   [2] No past surgical history on file.  [3]   Social History  Tobacco Use    Smoking status: Never    Smokeless tobacco: Never   Vaping Use    Vaping status: Former   Substance Use Topics    Alcohol use: Not Currently     Comment: social- prior to pregnancy    Drug use: Never   [4]   Current Outpatient Medications:     aspirin 81 mg chewable tablet, Chew 2 tablets (162 mg total) daily, Disp: 180 tablet, Rfl: 1    ondansetron (ZOFRAN) 4 mg tablet, Take 1 tablet (4 mg total) by mouth every 6 (six) hours as needed for nausea or vomiting, Disp: 30 tablet, Rfl: 0    Doxylamine Succinate, Sleep, (UNISOM PO), Take by mouth prn, Disp: , Rfl:     metoclopramide (Reglan) 10 mg tablet, Take 1 tablet  (10 mg total) by mouth 4 (four) times a day As needed for nause, Disp: 30 tablet, Rfl: 0    Prenatal Vit-Fe Fumarate-FA (SM PRENATAL VITAMINS PO), Take by mouth, Disp: , Rfl:     promethazine (PHENERGAN) 12.5 MG tablet, Take 1 tablet (12.5 mg total) by mouth every 6 (six) hours as needed for nausea or vomiting (Patient not taking: Reported on 6/13/2025), Disp: 30 tablet, Rfl: 0    Pyridoxine HCl (vitamin B-6) 25 MG tablet, Take by mouth daily prn, Disp: , Rfl:

## 2025-06-16 ENCOUNTER — RESULTS FOLLOW-UP (OUTPATIENT)
Dept: OBGYN CLINIC | Facility: CLINIC | Age: 25
End: 2025-06-16

## 2025-06-16 LAB
C TRACH DNA SPEC QL NAA+PROBE: NEGATIVE
N GONORRHOEA DNA SPEC QL NAA+PROBE: NEGATIVE

## 2025-06-19 ENCOUNTER — APPOINTMENT (OUTPATIENT)
Dept: LAB | Facility: HOSPITAL | Age: 25
End: 2025-06-19
Payer: COMMERCIAL

## 2025-06-19 DIAGNOSIS — Z34.02 ENCOUNTER FOR SUPERVISION OF NORMAL FIRST PREGNANCY IN SECOND TRIMESTER: ICD-10-CM

## 2025-06-19 DIAGNOSIS — Z3A.11 11 WEEKS GESTATION OF PREGNANCY: ICD-10-CM

## 2025-06-19 LAB
ABO GROUP BLD: NORMAL
BACTERIA UR QL AUTO: ABNORMAL /HPF
BASOPHILS # BLD AUTO: 0.03 THOUSANDS/ÂΜL (ref 0–0.1)
BASOPHILS NFR BLD AUTO: 0 % (ref 0–1)
BILIRUB UR QL STRIP: NEGATIVE
BLD GP AB SCN SERPL QL: NEGATIVE
CLARITY UR: CLEAR
COLOR UR: YELLOW
EOSINOPHIL # BLD AUTO: 0.18 THOUSAND/ÂΜL (ref 0–0.61)
EOSINOPHIL NFR BLD AUTO: 2 % (ref 0–6)
ERYTHROCYTE [DISTWIDTH] IN BLOOD BY AUTOMATED COUNT: 12.9 % (ref 11.6–15.1)
GLUCOSE UR STRIP-MCNC: NEGATIVE MG/DL
HBV SURFACE AG SER QL: NORMAL
HCT VFR BLD AUTO: 43.8 % (ref 34.8–46.1)
HCV AB SER QL: NORMAL
HGB BLD-MCNC: 14.1 G/DL (ref 11.5–15.4)
HGB UR QL STRIP.AUTO: NEGATIVE
HIV 1+2 AB+HIV1 P24 AG SERPL QL IA: NORMAL
IMM GRANULOCYTES # BLD AUTO: 0.03 THOUSAND/UL (ref 0–0.2)
IMM GRANULOCYTES NFR BLD AUTO: 0 % (ref 0–2)
KETONES UR STRIP-MCNC: ABNORMAL MG/DL
LEUKOCYTE ESTERASE UR QL STRIP: NEGATIVE
LYMPHOCYTES # BLD AUTO: 2.87 THOUSANDS/ÂΜL (ref 0.6–4.47)
LYMPHOCYTES NFR BLD AUTO: 29 % (ref 14–44)
MCH RBC QN AUTO: 26.5 PG (ref 26.8–34.3)
MCHC RBC AUTO-ENTMCNC: 32.2 G/DL (ref 31.4–37.4)
MCV RBC AUTO: 82 FL (ref 82–98)
MONOCYTES # BLD AUTO: 0.63 THOUSAND/ÂΜL (ref 0.17–1.22)
MONOCYTES NFR BLD AUTO: 6 % (ref 4–12)
NEUTROPHILS # BLD AUTO: 6.14 THOUSANDS/ÂΜL (ref 1.85–7.62)
NEUTS SEG NFR BLD AUTO: 63 % (ref 43–75)
NITRITE UR QL STRIP: NEGATIVE
NON-SQ EPI CELLS URNS QL MICRO: ABNORMAL /HPF
NRBC BLD AUTO-RTO: 0 /100 WBCS
PH UR STRIP.AUTO: 6 [PH]
PLATELET # BLD AUTO: 236 THOUSANDS/UL (ref 149–390)
PMV BLD AUTO: 9.7 FL (ref 8.9–12.7)
PROT S ACT/NOR PPP: 54 % (ref 68–108)
PROT UR STRIP-MCNC: NEGATIVE MG/DL
RBC # BLD AUTO: 5.33 MILLION/UL (ref 3.81–5.12)
RBC #/AREA URNS AUTO: ABNORMAL /HPF
RH BLD: NORMAL
RUBV IGG SERPL IA-ACNC: 43.9 IU/ML
SP GR UR STRIP.AUTO: 1.02 (ref 1–1.03)
TREPONEMA PALLIDUM IGG+IGM AB [PRESENCE] IN SERUM OR PLASMA BY IMMUNOASSAY: NORMAL
UROBILINOGEN UR QL STRIP.AUTO: 0.2 E.U./DL
WBC # BLD AUTO: 9.88 THOUSAND/UL (ref 4.31–10.16)
WBC #/AREA URNS AUTO: ABNORMAL /HPF

## 2025-06-19 PROCEDURE — 86901 BLOOD TYPING SEROLOGIC RH(D): CPT

## 2025-06-19 PROCEDURE — 86900 BLOOD TYPING SEROLOGIC ABO: CPT

## 2025-06-19 PROCEDURE — 86780 TREPONEMA PALLIDUM: CPT

## 2025-06-19 PROCEDURE — 86803 HEPATITIS C AB TEST: CPT

## 2025-06-19 PROCEDURE — 81001 URINALYSIS AUTO W/SCOPE: CPT

## 2025-06-19 PROCEDURE — 85613 RUSSELL VIPER VENOM DILUTED: CPT

## 2025-06-19 PROCEDURE — 87340 HEPATITIS B SURFACE AG IA: CPT

## 2025-06-19 PROCEDURE — 85306 CLOT INHIBIT PROT S FREE: CPT

## 2025-06-19 PROCEDURE — 85025 COMPLETE CBC W/AUTO DIFF WBC: CPT

## 2025-06-19 PROCEDURE — 86146 BETA-2 GLYCOPROTEIN ANTIBODY: CPT

## 2025-06-19 PROCEDURE — 86762 RUBELLA ANTIBODY: CPT

## 2025-06-19 PROCEDURE — 87389 HIV-1 AG W/HIV-1&-2 AB AG IA: CPT

## 2025-06-19 PROCEDURE — 86850 RBC ANTIBODY SCREEN: CPT

## 2025-06-20 ENCOUNTER — ANCILLARY PROCEDURE (OUTPATIENT)
Dept: PERINATAL CARE | Facility: OTHER | Age: 25
End: 2025-06-20
Attending: OBSTETRICS & GYNECOLOGY
Payer: COMMERCIAL

## 2025-06-20 ENCOUNTER — TELEPHONE (OUTPATIENT)
Age: 25
End: 2025-06-20

## 2025-06-20 ENCOUNTER — RESULTS FOLLOW-UP (OUTPATIENT)
Age: 25
End: 2025-06-20

## 2025-06-20 ENCOUNTER — ROUTINE PRENATAL (OUTPATIENT)
Dept: PERINATAL CARE | Facility: OTHER | Age: 25
End: 2025-06-20
Attending: PHYSICIAN ASSISTANT
Payer: COMMERCIAL

## 2025-06-20 VITALS
HEIGHT: 66 IN | DIASTOLIC BLOOD PRESSURE: 80 MMHG | WEIGHT: 199.2 LBS | SYSTOLIC BLOOD PRESSURE: 126 MMHG | BODY MASS INDEX: 32.02 KG/M2 | HEART RATE: 90 BPM

## 2025-06-20 DIAGNOSIS — Z33.1 NORMAL PREGNANCY, INCIDENTAL: ICD-10-CM

## 2025-06-20 DIAGNOSIS — Z34.90 EARLY STAGE OF PREGNANCY: ICD-10-CM

## 2025-06-20 DIAGNOSIS — Z33.1 PREGNANT STATE, INCIDENTAL: ICD-10-CM

## 2025-06-20 DIAGNOSIS — Z3A.12 12 WEEKS GESTATION OF PREGNANCY: Primary | ICD-10-CM

## 2025-06-20 DIAGNOSIS — Z36.0 ENCOUNTER FOR ANTENATAL SCREENING FOR CHROMOSOMAL ANOMALIES: ICD-10-CM

## 2025-06-20 LAB — BACTERIA UR CULT: NORMAL

## 2025-06-20 PROCEDURE — 76813 OB US NUCHAL MEAS 1 GEST: CPT | Performed by: OBSTETRICS & GYNECOLOGY

## 2025-06-20 PROCEDURE — 36415 COLL VENOUS BLD VENIPUNCTURE: CPT | Performed by: OBSTETRICS & GYNECOLOGY

## 2025-06-20 PROCEDURE — 99203 OFFICE O/P NEW LOW 30 MIN: CPT | Performed by: OBSTETRICS & GYNECOLOGY

## 2025-06-20 PROCEDURE — 76801 OB US < 14 WKS SINGLE FETUS: CPT | Performed by: OBSTETRICS & GYNECOLOGY

## 2025-06-20 PROCEDURE — NC001 PR NO CHARGE: Performed by: OBSTETRICS & GYNECOLOGY

## 2025-06-20 NOTE — PROGRESS NOTES
Patient chose to have [facility] [id number] Non-Invasive Prenatal Screen [id number] PLUS w/ SCA, WITH fetal sex.  Patient choose to be billed through insurance.     Patient given brochure and is aware [facility] will contact patient's insurance and coordinate coverage.  Provided [facility] contact information. General inquiries [phone], Cost estimates [phone] and [facility] Billing [phone]. Website [email or web address].     Blood collection tubes labeled with patient identifiers (name, medical record number, and date of birth).     Filled out [facility] order form. Patient chose to have blood drawn in our office at time of visit. NIPS was drawn from right arm with a butterfly needle by URIAH Aviles.      Maternal Fetal Medicine will have results in approximately 5-7 business days and will call patient or notify via CRAZE.  Patient aware viewing lab result online will reveal fetal sex if ordered.    Patient verbalized understanding of all instructions and no questions at this time.

## 2025-06-20 NOTE — TELEPHONE ENCOUNTER
"Patient is 12w5d pregnant, calling to follow up on tick bite from last week. Notes rash in bite area which is now raised. Unsure if there is a \"target\" slava. Otherwise denies fever or flu-like symptoms. Asking for recommendations from provider.     RN advised will route to provider for additional recommendations. Patient agreeable.   "

## 2025-06-20 NOTE — PROGRESS NOTES
"Jesu presents today for a genetic screening ultrasound.  This is her first pregnancy.  Other than a BMI of 32, she has no other significant medical, surgical, substance use, or family history.  A review of systems is otherwise negative.    We discussed the options for genetic screening, including but not limited to first trimester screening, second trimester screening, combined first and second trimester screening, noninvasive prenatal screening (NIPS) for patients at high risk and diagnostic screening through the use of CVS and amniocentesis. We discussed the risks and benefits of each approach including the sensitivities and false positive rates as well as the difference between a screening test and a diagnostic test. At the conclusion of our discussion the patient elected noninvasive prenatal screening utilizing the MaterniT 21 plus test. The patient had this blood work drawn in the office.   The results should be available in approximately 7-10 days.    We discussed follow-up in detail and I recommend an anatomy ultrasound be scheduled for 20 weeks gestation.    Thank you very much for allowing us to participate in the care of this very nice patient. Should you have any questions, please do not hesitate to contact me.    Portions of the record may have been created with voice recognition software. Occasional wrong word or \"sound a like\" substitutions may have occurred due to the inherent limitations of voice recognition software. Read the chart carefully and recognize, using context, where substitutions have occurred.  "

## 2025-06-20 NOTE — TELEPHONE ENCOUNTER
Patient called back, reviewed providers recommendations to see PCP or urgent care. No further questions.

## 2025-06-22 ENCOUNTER — TELEPHONE (OUTPATIENT)
Dept: OTHER | Facility: OTHER | Age: 25
End: 2025-06-22

## 2025-06-22 ENCOUNTER — PATIENT MESSAGE (OUTPATIENT)
Dept: PERINATAL CARE | Facility: CLINIC | Age: 25
End: 2025-06-22

## 2025-06-22 NOTE — TELEPHONE ENCOUNTER
Pt reached answering service, she is calling for her pcp who is under [facility] for a tick bite that has a spreading rash. Provided the # listed for Dr. Tanner's office [phone].   [facility]    History and Physical - Hospitalist Service       Date of Admission:  3/18/2025    Assessment & Plan      Elsa Zapien is a 71 year old female bilateral severe carpal tunnel syndrome, hypertension, hepatitis C, depression, open-angle glaucoma, alcohol and drug abuse in the past reportedly sober for more than a decade, chronically elevated liver enzymes, epilepsy with a history of focal epilepsy with impaired awareness with occasional secondary generalization who presented to the ER after an episode of syncope likely secondary to seizure.  While in the ER patient had another episode of seizure.  Patient was loaded with Keppra and admitted to the hospital for further management.    On presentation to the ER patient was afebrile, pulse 72, blood pressure 175/81, saturating well on room air.  Workup revealed WBC 7.7, hemoglobin 11, platelet 229, sodium 132, creatinine 0.64, blood glucose 236, troponin 82 at 18 then decreased to 16.  UA with negative nitrite and small leukocyte esterase, urine cultures in process, patient tested negative for COVID, influenza A, B, RSV.  Underwent CT head and facial bones for trauma workup, no CT evidence of acute intracranial pathology, CT facial bones showed left periorbital soft tissue swelling, no facial bone fracture or malalignment, multifocal dental decay.  Phenytoin and zonisamide levels percent.     History of refractory epilepsy  History of focal epilepsy with occasional secondary generalization  Breakthrough seizure  Syncope likely secondary to seizure  Patient was outside home when she fell and lost her consciousness probably due to seizure.  Follows neurology at [facility].  Prior to admission she was on Dilantin and zonisamide.  Patient reported taking medications as prescribed.  Upon admission phenytoin levels are appropriate at 12, zonisamide levels are in process.  Initial plan was to send patient home however while in the  ER she had another episode of seizure.  It abated on its own.  Patient was loaded with IV Keppra and admission to inpatient was requested.    -Follow-up on zonisamide level  - Resume prior to admission antiepileptic medication after pharmacy med reconciliation  - Loaded with IV Keppra 1000 mg in the ER.  Start patient on IV Keppra 500 mg twice daily until patient is seen by neurology.  - Neurology consult placed  - Seizure precautions    Mild hyponatremia  Sodium upon admission 132, monitor.  If worsening would do workup for further evaluation    Abnormal UA  Patient complains of any dysuria symptoms.  UA showed leukocyte esterase and 15 WBCs.  Urine also had 5 squamous cells.  Since patient is hemodynamically stable and urine sample has a squamous cell so would hold antibiotics at this time.  Follow-up on urine cultures      Severe bilateral carpal tunnel syndrome  This was confirmed on EMG.  Follows orthopedic outpatient.  Had  recently received left steroid injection.    Chronic  pain  Home meloxicam.  Per  patient has been getting frequent prescriptions of 5 to 10 pills of oxycodone.  Likely need pain medications this hospitalization as she is coming in after sustaining a fall and getting left periorbital swelling and pain.  Will order Tylenol, and p.o. oxycodone 5 mg every 6 hours as needed.    Hypertension  Resume prior to admission lisinopril pharmacy med reconciliation    Open-angle glaucoma  Continue prior to admission eyedrops.  Is also on p.o. acetazolamide.  After pharmacy med reconciliation              Diet: Combination Diet Regular Diet Adult    DVT Prophylaxis: Pneumatic Compression Devices  Randolph Catheter: Not present  Lines: None     Cardiac Monitoring: None  Code Status: Full Code      Clinically Significant Risk Factors Present on Admission         # Hyponatremia: Lowest Na = 132 mmol/L in last 2 days, will monitor as appropriate           # Hypertension: Home medication list includes  antihypertensive(s)                      Disposition Plan     Medically Ready for Discharge: Anticipated in 2-4 Days           Marisa Trujillo MD  Hospitalist Service  [facility]  Securely message with [employer] Fem[employer] (more info)  Text page via [employer] Paging/Directory     ______________________________________________________________________    Chief Complaint   Seizure  Fall    History is obtained from the patient, ER doctor, and the chart review    History of Present Illness   Elsa Zapien is a 71 year old female bilateral severe carpal tunnel syndrome, hypertension, hepatitis C, depression, open-angle glaucoma, alcohol and drug abuse in the past reportedly sober for more than a decade, chronically elevated liver enzymes, epilepsy with a history of focal epilepsy with impaired awareness with occasional secondary generalization who presented to the ER after an episode of syncope likely secondary to seizure.  While in the ER patient had another episode of seizure.  Patient was loaded with Keppra and admitted to the hospital for further management.      Has history of refractory seizure.  She follows neurology outpatient and she is on 2 antiepileptic medications.  Patient reported compliance with the medications.  She went outside for smoke when she fell unconscious.  When she woke up she called her daughter using her medical alert button.  Due to trauma to her face after sustaining a fall she was brought to the ER for further evaluation.  In the ER trauma workup was negative for any acute fracture or dislocation of the facial bones.  While in the ER patient was noted to have another episode of seizure.  At that time she was loaded with Keppra and admission to inpatient requested.      Past Medical History    Past Medical History:   Diagnosis Date    Alzheimer disease (H)     Diabetes (H)     Glaucoma     Hypertension     Seizures (H)        Past Surgical History   No past surgical history on  file.    Prior to Admission Medications   Prior to Admission Medications   Prescriptions Last Dose Informant Patient Reported? Taking?   LUBRICANT EYE DROPS 0.5 % SOLN ophthalmic solution   Yes No   Sig: Place 1 drop Into the left eye 4 times daily as needed   Lancet Devices (ULTI-MADALYN AUTOMATIC) MISC   Yes No   Sig: Automatic lancing device   Multiple Vitamin (MULTIVITAMINS PO)   Yes No   Sig: Take 1 tablet by mouth daily   TRUEplus Lancets 30G MISC   Yes No   Sig: Use to check blood sugar every day as directed.   acetaZOLAMIDE (DIAMOX SEQUEL) 500 MG 12 hr capsule   Yes No   Sig: Take 1 capsule (500 mg) by mouth 2 times daily   bisacodyl (DULCOLAX) 10 MG suppository   Yes No   Sig: Place 10 mg rectally daily as needed for constipation   blood glucose (NO BRAND SPECIFIED) test strip   Yes No   Sig: Use to test blood sugar 1 times daily or as directed. Uses One Touch Ultra.   brimonidine (ALPHAGAN P) 0.1 % ophthalmic solution   Yes No   Sig: Place 1 drop Into the left eye 2 times daily   capsicum oleoresin (TRIXAICIN) 0.025 % external cream   Yes No   Sig: Apply topically 2 times daily as needed (Left knee pain)   cyclobenzaprine (FLEXERIL) 10 MG tablet   No No   Sig: Take 1 tablet (10 mg) by mouth 3 times daily as needed for other (Pain).   docusate sodium (COLACE) 100 MG capsule   Yes No   Sig: Take 1 capsule by mouth daily as needed   dorzolamide-timolol (COSOPT) 2-0.5 % ophthalmic solution   Yes No   Sig: Place 1 drop Into the left eye 2 times daily   erythromycin (ROMYCIN) 5 MG/GM ophthalmic ointment   Yes No   Sig: Place 0.5 inches Into the left eye 4 times daily Apply to left eye after each application of eye drops (4 times per day)   latanoprost (XALATAN) 0.005 % ophthalmic solution   Yes No   Sig: Place 1 drop into both eyes At Bedtime   lisinopril (PRINIVIL/ZESTRIL) 40 MG tablet   Yes No   Sig: Take 1 tablet (40 mg) by mouth At Bedtime   magnesium citrate solution   Yes No   Sig: Take 296 mLs by mouth once  for 1 dose If you don't have a bowel movement after 8 hours, then drink the second bottle.   meloxicam (MOBIC) 15 MG tablet   Yes No   Sig: Take 1 tablet (15 mg) by mouth daily   nicotine (NICOTROL) 10 MG inhaler   Yes No   Sig: Inhale 6-16 Cartridges into the lungs daily as needed for smoking cessation (Inhale 10mg every one hour as needed for nicotine cravings.)   ondansetron (ZOFRAN) 4 MG tablet   Yes No   Sig: Take 4 mg by mouth every 8 hours as needed for nausea   oxyCODONE (ROXICODONE) 5 MG tablet   No No   Sig: Take 1 tablet (5 mg) by mouth every 6 hours as needed for moderate to severe pain. Do not take together at same time as Flexeril (cyclobenzaprine)   oxyCODONE-acetaminophen (PERCOCET) 5-325 MG tablet   Yes No   Sig: Take 2 tablets by mouth every 4 hours as needed for pain   phenytoin (DILANTIN) 100 MG capsule   Yes No   Sig: Take 1 capsule (100 mg) by mouth every morning AND 2 capsules (200 mg) At Bedtime. Takes this along with 30mg tablets for a total dosage of 160mg in the morning, and 200mg in the evening.   phenytoin (DILANTIN) 30 MG capsule   Yes No   Sig: Take 2 capsules (60 mg) by mouth every morning Takes this along with 100mg tablets for a total dosage of 160mg in the morning, and 200mg in the evening.   polyethylene glycol (MIRALAX/GLYCOLAX) packet   Yes No   Sig: Take 17 g by mouth daily   predniSONE (DELTASONE) 20 MG tablet   No No   Sig: Take two tablets (= 40mg) each day for 5 (five) days   prednisoLONE acetate (PRED FORTE) 1 % ophthalmic suspension   Yes No   Sig: Place 1 drop Into the left eye 4 times daily   ranitidine (ZANTAC) 150 MG tablet   Yes No   Sig: Take 1 tablet (150 mg) by mouth 2 times daily   zonisamide (ZONEGRAN) 100 MG capsule   Yes No   Sig: Take 5 capsules (500 mg) by mouth At Bedtime      Facility-Administered Medications: None        Review of Systems    The 10 point Review of Systems is negative other than noted in the HPI or here.      Physical Exam   Vital Signs:  Temp: 97.8  F (36.6  C) Temp src: Oral BP: (!) 162/85 Pulse: 71   Resp: 16 SpO2: 100 % O2 Device: None (Room air)    Weight: 0 lbs 0 oz    Constitutional: awake, alert, cooperative, no apparent distress, and appears stated age  Eyes: Anicteric sclera, left periorbital edema  ENT: normocephalic, without obvious abnormality  Respiratory: On room air, equal air entry bilaterally, no wheezing or crackle  Cardiovascular: Normal rate, regular rhythm, no murmur  GI: Soft, nontender, nondistended  Musculoskeletal: no lower extremity pitting edema present  Neurologic: Sleepy but easily arousable, oriented x 3 but slow to respond, follows commands but unable to provide detailed history due to lethargy  Neuropsychiatric: Somewhat lethargic    Medical Decision Making       65 MINUTES SPENT BY ME on the date of service doing chart review, history, exam, documentation & further activities per the note.      Data   ------------------------- PAST 24 HR DATA REVIEWED -----------------------------------------------

## 2025-06-23 ENCOUNTER — TELEPHONE (OUTPATIENT)
Age: 25
End: 2025-06-23

## 2025-06-23 NOTE — TELEPHONE ENCOUNTER
Called pt and clarified reason for requesting intermittent FMLA. Pt confirms that it is due to her nausea and vomiting. Pt states that she gets episodes of nausea and vomiting that leave her feeling lightheaded. She is requesting 2-3 days per week if intermittent FMLA in the even that she has to leave work early or call of work. She states that she current medications she has tried do not significantly improve her symptoms.

## 2025-06-24 LAB
B2 GLYCOPROT1 IGA SERPL IA-ACNC: <0.3
B2 GLYCOPROT1 IGG SERPL IA-ACNC: 1.9
B2 GLYCOPROT1 IGM SERPL IA-ACNC: <2.4

## 2025-06-26 LAB
CFDNA.FET/CFDNA.TOTAL SFR FETUS: NORMAL %
CFDNA.FET/CFDNA.TOTAL SFR FETUS: NORMAL %
CITATION REF LAB TEST: NORMAL
FET 13+18+21+X+Y ANEUP PLAS.CFDNA: NEGATIVE
FET CHR 21 TS PLAS.CFDNA QL: NEGATIVE
FET CHR 21 TS PLAS.CFDNA QL: NEGATIVE
FET MS X RISK WBC.DNA+CFDNA QL: NOT DETECTED
FET SEX PLAS.CFDNA DOSAGE CFDNA: NORMAL
FET TS 13 RISK PLAS.CFDNA QL: NEGATIVE
FET X + Y ANEUP RISK PLAS.CFDNA SEQ-IMP: NOT DETECTED
GA EST FROM CONCEPTION DATE: NORMAL D
GESTATIONAL AGE > 9:: YES
LAB DIRECTOR NAME PROVIDER: NORMAL
LABORATORY COMMENT REPORT: NORMAL
LIMITATIONS OF THE TEST: NORMAL
NEGATIVE PREDICTIVE VALUE: NORMAL
PERFORMANCE CHARACTERISTICS: NORMAL
POSITIVE PREDICTIVE VALUE: NORMAL
REF LAB TEST METHOD: NORMAL
SL AMB NOTE:: NORMAL
TEST PERFORMANCE INFO SPEC: NORMAL

## 2025-06-27 PROBLEM — Z82.49 FAMILY HISTORY OF DVT: Status: ACTIVE | Noted: 2025-06-27

## 2025-06-27 LAB
LA PPP-IMP: NORMAL
SCREEN APTT: 35.5 SEC (ref 0–43.5)
SCREEN DRVVT: 38.1 SEC (ref 0–47)

## 2025-07-10 ENCOUNTER — ROUTINE PRENATAL (OUTPATIENT)
Age: 25
End: 2025-07-10

## 2025-07-10 ENCOUNTER — APPOINTMENT (OUTPATIENT)
Dept: LAB | Facility: HOSPITAL | Age: 25
End: 2025-07-10
Payer: COMMERCIAL

## 2025-07-10 VITALS
BODY MASS INDEX: 31.79 KG/M2 | OXYGEN SATURATION: 100 % | SYSTOLIC BLOOD PRESSURE: 124 MMHG | HEIGHT: 66 IN | WEIGHT: 197.8 LBS | HEART RATE: 77 BPM | DIASTOLIC BLOOD PRESSURE: 94 MMHG

## 2025-07-10 DIAGNOSIS — O21.9 NAUSEA AND VOMITING IN PREGNANCY: ICD-10-CM

## 2025-07-10 DIAGNOSIS — R03.0 ELEVATED BP WITHOUT DIAGNOSIS OF HYPERTENSION: ICD-10-CM

## 2025-07-10 DIAGNOSIS — Z3A.15 15 WEEKS GESTATION OF PREGNANCY: ICD-10-CM

## 2025-07-10 DIAGNOSIS — Z36.1 NEED FOR MATERNAL SERUM ALPHA-PROTEIN (MSAFP) SCREENING: ICD-10-CM

## 2025-07-10 DIAGNOSIS — Z82.49 FAMILY HISTORY OF DVT: ICD-10-CM

## 2025-07-10 DIAGNOSIS — O99.212 OBESITY AFFECTING PREGNANCY IN SECOND TRIMESTER, UNSPECIFIED OBESITY TYPE: ICD-10-CM

## 2025-07-10 DIAGNOSIS — O09.92 SUPERVISION OF HIGH RISK PREGNANCY IN SECOND TRIMESTER: ICD-10-CM

## 2025-07-10 DIAGNOSIS — Z3A.15 15 WEEKS GESTATION OF PREGNANCY: Primary | ICD-10-CM

## 2025-07-10 LAB
ALBUMIN SERPL BCG-MCNC: 3.6 G/DL (ref 3.5–5)
ALP SERPL-CCNC: 36 U/L (ref 34–104)
ALT SERPL W P-5'-P-CCNC: 15 U/L (ref 7–52)
ANION GAP SERPL CALCULATED.3IONS-SCNC: 8 MMOL/L (ref 4–13)
AST SERPL W P-5'-P-CCNC: 13 U/L (ref 13–39)
BILIRUB SERPL-MCNC: 0.42 MG/DL (ref 0.2–1)
BUN SERPL-MCNC: 7 MG/DL (ref 5–25)
CALCIUM SERPL-MCNC: 8.8 MG/DL (ref 8.4–10.2)
CHLORIDE SERPL-SCNC: 106 MMOL/L (ref 96–108)
CO2 SERPL-SCNC: 23 MMOL/L (ref 21–32)
CREAT SERPL-MCNC: 0.73 MG/DL (ref 0.6–1.3)
CREAT UR-MCNC: 195 MG/DL
ERYTHROCYTE [DISTWIDTH] IN BLOOD BY AUTOMATED COUNT: 13.2 % (ref 11.6–15.1)
GFR SERPL CREATININE-BSD FRML MDRD: 114 ML/MIN/1.73SQ M
GLUCOSE SERPL-MCNC: 95 MG/DL (ref 65–140)
HCT VFR BLD AUTO: 43 % (ref 34.8–46.1)
HGB BLD-MCNC: 13.8 G/DL (ref 11.5–15.4)
MCH RBC QN AUTO: 26.5 PG (ref 26.8–34.3)
MCHC RBC AUTO-ENTMCNC: 32.1 G/DL (ref 31.4–37.4)
MCV RBC AUTO: 83 FL (ref 82–98)
PLATELET # BLD AUTO: 233 THOUSANDS/UL (ref 149–390)
PMV BLD AUTO: 9.6 FL (ref 8.9–12.7)
POTASSIUM SERPL-SCNC: 3.7 MMOL/L (ref 3.5–5.3)
PROT SERPL-MCNC: 6.5 G/DL (ref 6.4–8.4)
PROT UR-MCNC: 9.3 MG/DL
PROT/CREAT UR: 0 MG/G{CREAT}
RBC # BLD AUTO: 5.21 MILLION/UL (ref 3.81–5.12)
SODIUM SERPL-SCNC: 137 MMOL/L (ref 135–147)
WBC # BLD AUTO: 8.27 THOUSAND/UL (ref 4.31–10.16)

## 2025-07-10 PROCEDURE — 36415 COLL VENOUS BLD VENIPUNCTURE: CPT

## 2025-07-10 PROCEDURE — 80053 COMPREHEN METABOLIC PANEL: CPT

## 2025-07-10 PROCEDURE — 84156 ASSAY OF PROTEIN URINE: CPT

## 2025-07-10 PROCEDURE — 82105 ALPHA-FETOPROTEIN SERUM: CPT

## 2025-07-10 PROCEDURE — PNV: Performed by: OBSTETRICS & GYNECOLOGY

## 2025-07-10 PROCEDURE — 82570 ASSAY OF URINE CREATININE: CPT

## 2025-07-10 PROCEDURE — 85027 COMPLETE CBC AUTOMATED: CPT

## 2025-07-10 RX ORDER — ONDANSETRON 4 MG/1
4 TABLET, FILM COATED ORAL EVERY 6 HOURS PRN
Qty: 30 TABLET | Refills: 2 | Status: SHIPPED | OUTPATIENT
Start: 2025-07-10

## 2025-07-10 NOTE — PROGRESS NOTES
"OB/GYN  PN Visit  Margot Torres  [id number]  7/10/2025  9:01 AM  Dr. Phoebe Ramos MD    S: 24 y.o.  15w4d here for PN visit.       Chief Complaint   Patient presents with    Routine Prenatal Visit         OB complaints:  Contractions: no  Leakage: no  Bleeding: no  Fetal movement: no      O:  /90 (BP Location: Left arm, Patient Position: Sitting, Cuff Size: Standard)   Pulse 77   Ht 5' 6\" (1.676 m)   Wt 89.7 kg (197 lb 12.8 oz)   LMP 2025 (Approximate)   SpO2 100%   BMI 31.93 kg/m²       Review of Systems   Constitutional: Negative.    HENT: Negative.     Eyes: Negative.    Respiratory: Negative.     Cardiovascular: Negative.    Gastrointestinal: Negative.    Endocrine: Negative.    Genitourinary:         As noted in HPI   Musculoskeletal: Negative.    Skin: Negative.    Allergic/Immunologic: Negative.    Neurological: Negative.    Hematological: Negative.    Psychiatric/Behavioral: Negative.           Physical Exam  Constitutional:       General: She is not in acute distress.     Appearance: Normal appearance. She is well-developed.   Abdominal:      Palpations: Abdomen is soft.      Tenderness: There is no abdominal tenderness. There is no guarding.     Neurological:      Mental Status: She is alert and oriented to person, place, and time.     Skin:     General: Skin is warm and dry.     Psychiatric:         Behavior: Behavior normal.             Pregravid Weight/BMI: 93.9 kg (207 lb) (BMI 33.43)  Current Weight: 89.7 kg (197 lb 12.8 oz)   Total Weight Gain: -4.173 kg (-9 lb 3.2 oz)   Pre- Vitals      Flowsheet Row Most Recent Value   Prenatal Assessment    Fetal Heart Rate 136   Movement Absent   Prenatal Vitals    Blood Pressure 130/90   Weight - Scale 89.7 kg (197 lb 12.8 oz)   Urine Albumin/Glucose    Dilation/Effacement/Station    Vaginal Drainage    Edema              Problem List          Obstetrics/Gynecology    15 weeks gestation of pregnancy    Obesity affecting " pregnancy in second trimester    Supervision of high risk pregnancy in second trimester       Other    Family history of DVT    Overview   Father with h/o DVT   Inherited thrombophilia panel negative           Other Visit Diagnoses         Need for maternal serum alpha-protein (MSAFP) screening          Elevated BP without diagnosis of hypertension          Nausea and vomiting in pregnancy               Patient denies any headache, blurry vision or epigastric pain  She has no history of hypertension in the past  Diastolic blood pressure was a little bit elevated, recheck still shows diastolic is slightly elevated  Advised labs to rule out preeclampsia  Return in 2 weeks for blood pressure check    Zofran refilled for nausea and vomiting    AFP was ordered      Future Appointments   Date Time Provider Department Center   2025 11:00 AM Amy Swenson MD [facility]   8/15/2025  8:00 AM  [facility]               Phoebe Ramos MD  7/10/2025  9:01 AM

## 2025-07-12 LAB
2ND TRIMESTER 4 SCREEN SERPL-IMP: NORMAL
AFP ADJ MOM SERPL: 1.16
AFP INTERP AMN-IMP: NORMAL
AFP INTERP SERPL-IMP: NORMAL
AFP INTERP SERPL-IMP: NORMAL
AFP SERPL-MCNC: 30.7 NG/ML
AGE AT DELIVERY: 27.1 YR
GA METHOD: NORMAL
GA: 15.6 WEEKS
IDDM PATIENT QL: NO
MULTIPLE PREGNANCY: NO
NEURAL TUBE DEFECT RISK FETUS: 7366 %

## 2025-07-22 PROBLEM — Z3A.17 17 WEEKS GESTATION OF PREGNANCY: Status: ACTIVE | Noted: 2025-06-13

## 2025-08-07 ENCOUNTER — ROUTINE PRENATAL (OUTPATIENT)
Age: 25
End: 2025-08-07

## 2025-08-07 VITALS
WEIGHT: 207 LBS | SYSTOLIC BLOOD PRESSURE: 122 MMHG | HEIGHT: 66 IN | DIASTOLIC BLOOD PRESSURE: 96 MMHG | BODY MASS INDEX: 33.27 KG/M2 | OXYGEN SATURATION: 99 % | HEART RATE: 78 BPM | TEMPERATURE: 98 F

## 2025-08-07 DIAGNOSIS — O09.92 SUPERVISION OF HIGH RISK PREGNANCY IN SECOND TRIMESTER: ICD-10-CM

## 2025-08-07 DIAGNOSIS — O99.212 OBESITY AFFECTING PREGNANCY IN SECOND TRIMESTER, UNSPECIFIED OBESITY TYPE: ICD-10-CM

## 2025-08-07 DIAGNOSIS — Z3A.19 19 WEEKS GESTATION OF PREGNANCY: ICD-10-CM

## 2025-08-07 DIAGNOSIS — O10.919 CHRONIC HYPERTENSION AFFECTING PREGNANCY: Primary | ICD-10-CM

## 2025-08-07 PROBLEM — R03.0 ELEVATED BLOOD PRESSURE READING IN OFFICE WITHOUT DIAGNOSIS OF HYPERTENSION: Status: ACTIVE | Noted: 2025-08-07

## 2025-08-07 PROCEDURE — PNV: Performed by: OBSTETRICS & GYNECOLOGY

## 2025-08-12 PROBLEM — Z3A.20 20 WEEKS GESTATION OF PREGNANCY: Status: ACTIVE | Noted: 2025-08-12

## 2025-08-15 ENCOUNTER — ANCILLARY PROCEDURE (OUTPATIENT)
Dept: PERINATAL CARE | Facility: OTHER | Age: 25
End: 2025-08-15
Attending: OBSTETRICS & GYNECOLOGY
Payer: COMMERCIAL
